# Patient Record
Sex: MALE | Race: BLACK OR AFRICAN AMERICAN | Employment: UNEMPLOYED | URBAN - METROPOLITAN AREA
[De-identification: names, ages, dates, MRNs, and addresses within clinical notes are randomized per-mention and may not be internally consistent; named-entity substitution may affect disease eponyms.]

---

## 2021-08-03 ENCOUNTER — APPOINTMENT (OUTPATIENT)
Dept: GENERAL RADIOLOGY | Age: 31
End: 2021-08-03

## 2021-08-03 ENCOUNTER — HOSPITAL ENCOUNTER (EMERGENCY)
Age: 31
Discharge: HOME | End: 2021-08-03
Attending: EMERGENCY MEDICINE

## 2021-08-03 VITALS
OXYGEN SATURATION: 100 % | DIASTOLIC BLOOD PRESSURE: 95 MMHG | RESPIRATION RATE: 14 BRPM | TEMPERATURE: 98.3 F | HEART RATE: 72 BPM | SYSTOLIC BLOOD PRESSURE: 159 MMHG | WEIGHT: 182 LBS | HEIGHT: 68 IN | BODY MASS INDEX: 27.58 KG/M2

## 2021-08-03 DIAGNOSIS — R07.9 CHEST PAIN, UNSPECIFIED TYPE: Primary | ICD-10-CM

## 2021-08-03 LAB
A/G RATIO: 1.9 (ref 1.1–2.2)
ALBUMIN SERPL-MCNC: 4.4 G/DL (ref 3.4–5)
ALP BLD-CCNC: 64 U/L (ref 40–129)
ALT SERPL-CCNC: 25 U/L (ref 10–40)
ANION GAP SERPL CALCULATED.3IONS-SCNC: 8 MMOL/L (ref 3–16)
AST SERPL-CCNC: 33 U/L (ref 15–37)
BASOPHILS ABSOLUTE: 0.1 K/UL (ref 0–0.2)
BASOPHILS RELATIVE PERCENT: 0.9 %
BILIRUB SERPL-MCNC: <0.2 MG/DL (ref 0–1)
BUN BLDV-MCNC: 15 MG/DL (ref 7–20)
CALCIUM SERPL-MCNC: 9 MG/DL (ref 8.3–10.6)
CHLORIDE BLD-SCNC: 103 MMOL/L (ref 99–110)
CO2: 28 MMOL/L (ref 21–32)
CREAT SERPL-MCNC: 0.9 MG/DL (ref 0.9–1.3)
EKG ATRIAL RATE: 72 BPM
EKG DIAGNOSIS: NORMAL
EKG P AXIS: 52 DEGREES
EKG P-R INTERVAL: 182 MS
EKG Q-T INTERVAL: 376 MS
EKG QRS DURATION: 86 MS
EKG QTC CALCULATION (BAZETT): 411 MS
EKG R AXIS: 79 DEGREES
EKG T AXIS: 55 DEGREES
EKG VENTRICULAR RATE: 72 BPM
EOSINOPHILS ABSOLUTE: 0.3 K/UL (ref 0–0.6)
EOSINOPHILS RELATIVE PERCENT: 4.2 %
GFR AFRICAN AMERICAN: >60
GFR NON-AFRICAN AMERICAN: >60
GLOBULIN: 2.3 G/DL
GLUCOSE BLD-MCNC: 91 MG/DL (ref 70–99)
HCT VFR BLD CALC: 41.2 % (ref 40.5–52.5)
HEMOGLOBIN: 14.1 G/DL (ref 13.5–17.5)
LYMPHOCYTES ABSOLUTE: 2.2 K/UL (ref 1–5.1)
LYMPHOCYTES RELATIVE PERCENT: 33.6 %
MCH RBC QN AUTO: 27.4 PG (ref 26–34)
MCHC RBC AUTO-ENTMCNC: 34.1 G/DL (ref 31–36)
MCV RBC AUTO: 80.3 FL (ref 80–100)
MONOCYTES ABSOLUTE: 0.4 K/UL (ref 0–1.3)
MONOCYTES RELATIVE PERCENT: 6.3 %
NEUTROPHILS ABSOLUTE: 3.6 K/UL (ref 1.7–7.7)
NEUTROPHILS RELATIVE PERCENT: 55 %
PDW BLD-RTO: 13 % (ref 12.4–15.4)
PLATELET # BLD: 184 K/UL (ref 135–450)
PMV BLD AUTO: 9 FL (ref 5–10.5)
POTASSIUM SERPL-SCNC: 3.9 MMOL/L (ref 3.5–5.1)
RBC # BLD: 5.13 M/UL (ref 4.2–5.9)
SODIUM BLD-SCNC: 139 MMOL/L (ref 136–145)
TOTAL PROTEIN: 6.7 G/DL (ref 6.4–8.2)
TROPONIN: <0.01 NG/ML
WBC # BLD: 6.6 K/UL (ref 4–11)

## 2021-08-03 PROCEDURE — 80053 COMPREHEN METABOLIC PANEL: CPT

## 2021-08-03 PROCEDURE — 99283 EMERGENCY DEPT VISIT LOW MDM: CPT

## 2021-08-03 PROCEDURE — 85025 COMPLETE CBC W/AUTO DIFF WBC: CPT

## 2021-08-03 PROCEDURE — 84484 ASSAY OF TROPONIN QUANT: CPT

## 2021-08-03 PROCEDURE — 93005 ELECTROCARDIOGRAM TRACING: CPT | Performed by: EMERGENCY MEDICINE

## 2021-08-03 PROCEDURE — 93010 ELECTROCARDIOGRAM REPORT: CPT | Performed by: INTERNAL MEDICINE

## 2021-08-03 PROCEDURE — 71046 X-RAY EXAM CHEST 2 VIEWS: CPT

## 2021-08-03 RX ORDER — BUPRENORPHINE AND NALOXONE 8; 2 MG/1; MG/1
1 FILM, SOLUBLE BUCCAL; SUBLINGUAL DAILY
COMMUNITY

## 2021-08-03 RX ORDER — FAMOTIDINE 20 MG/1
20 TABLET, FILM COATED ORAL 2 TIMES DAILY
Qty: 20 TABLET | Refills: 0 | Status: ON HOLD | OUTPATIENT
Start: 2021-08-03 | End: 2021-08-24 | Stop reason: HOSPADM

## 2021-08-03 ASSESSMENT — PAIN DESCRIPTION - LOCATION: LOCATION: BREAST;CHEST

## 2021-08-03 ASSESSMENT — PAIN SCALES - GENERAL: PAINLEVEL_OUTOF10: 2

## 2021-08-03 ASSESSMENT — PAIN DESCRIPTION - DESCRIPTORS: DESCRIPTORS: ACHING;BURNING

## 2021-08-03 ASSESSMENT — PAIN DESCRIPTION - PAIN TYPE: TYPE: ACUTE PAIN

## 2021-08-03 ASSESSMENT — PAIN DESCRIPTION - ORIENTATION: ORIENTATION: LEFT

## 2021-08-03 NOTE — ED NOTES
Patient given prescription, work note, discharge instructions verbal and written, patient verbalized understanding. Alert/oriented X4, Clear speech.   Patient exhibits no distress, ambulates with steady gait per self leaving unit, no further request.     Mabel Dandy, RN  08/03/21 3427

## 2021-08-03 NOTE — ED NOTES
Patient given prescription, work note, discharge instructions verbal and written, patient verbalized understanding. Alert/oriented X4, Clear speech.   Patient exhibits no distress, ambulates with steady gait per self leaving unit, no further request.     Nai Miranda RN  08/03/21 0089

## 2021-08-03 NOTE — ED TRIAGE NOTES
Patient to ed with complaints of left chest/breats pain which started 4 days ago, patient reports pain increasing with lifting or increased activity he describes pain as a burning he denies radiating pain, no nausea or vomiting.  ekg bedside.

## 2021-08-03 NOTE — ED PROVIDER NOTES
CHIEF COMPLAINT  Chest Pain (left breast area)      HISTORY OF PRESENT ILLNESS  March Serum J Case is a 32 y.o. male with a history of hypertension who presents to the ED complaining of chest pain/chest wall pain. Patient denies any obvious trauma or injury. Reports that he is constantly required to move heavy objects at work. Patient has noted some moderate \"burning \"in the left side of his chest over the last several days. Symptoms are worse with heavy lifting and patient is also noted symptoms with exertion. No fevers, chills, or sweats. No cough, or congestion. No lower extremity swelling or edema. .   No other complaints, modifying factors or associated symptoms. I have reviewed the following from the nursing documentation. Past Medical History:   Diagnosis Date    Hypertension      History reviewed. No pertinent surgical history. History reviewed. No pertinent family history. Social History     Socioeconomic History    Marital status: Single     Spouse name: Not on file    Number of children: Not on file    Years of education: Not on file    Highest education level: Not on file   Occupational History    Not on file   Tobacco Use    Smoking status: Current Every Day Smoker     Packs/day: 1.00     Types: Cigarettes    Smokeless tobacco: Never Used   Substance and Sexual Activity    Alcohol use: Yes     Comment: 2-3 x week    Drug use: Yes     Types: Opiates      Comment: snorting    Sexual activity: Not on file   Other Topics Concern    Not on file   Social History Narrative    Not on file     Social Determinants of Health     Financial Resource Strain:     Difficulty of Paying Living Expenses:    Food Insecurity:     Worried About Running Out of Food in the Last Year:     Ran Out of Food in the Last Year:    Transportation Needs:     Lack of Transportation (Medical):      Lack of Transportation (Non-Medical):    Physical Activity:     Days of Exercise per Week:     Minutes of Exercise per Session:    Stress:     Feeling of Stress :    Social Connections:     Frequency of Communication with Friends and Family:     Frequency of Social Gatherings with Friends and Family:     Attends [employer] Services:     Active Member of Clubs or Organizations:     Attends Club or Organization Meetings:     Marital Status:    Intimate Partner Violence:     Fear of Current or Ex-Partner:     Emotionally Abused:     Physically Abused:     Sexually Abused:      No current facility-administered medications for this encounter. Current Outpatient Medications   Medication Sig Dispense Refill    buprenorphine-naloxone (SUBOXONE) 8-2 MG FILM SL film Place 1 Film under the tongue daily.  famotidine (PEPCID) 20 MG tablet Take 1 tablet by mouth 2 times daily 20 tablet 0     No Known Allergies    REVIEW OF SYSTEMS  10 systems reviewed, pertinent positives per HPI otherwise noted to be negative. PHYSICAL EXAM  BP (!) 159/95   Pulse 72   Temp 98.3 °F (36.8 °C) (Oral)   Resp 14   Ht 5' 8\" (1.727 m)   Wt 182 lb (82.6 kg)   SpO2 100%   BMI 27.67 kg/m²   GENERAL APPEARANCE: Awake and alert. Cooperative. No acute distress. HEAD: Normocephalic. Atraumatic. EYES: PERRL. EOM's grossly intact. .  ENT: Mucous membranes are moist.   NECK: Supple, trachea midline. HEART: RRR. Normal S1, S2. No murmurs, rubs or gallops. LUNGS: Respirations unlabored. CTAB. Good air exchange. No wheezes, rales, or rhonchi. Speaking comfortably in full sentences. ABDOMEN: Soft. Non-distended. Non-tender. No guarding or rebound. Normal Bowel sounds. EXTREMITIES: No peripheral edema. MAEE. No acute deformities. SKIN: Warm and dry. No acute rashes. NEUROLOGICAL: Alert and oriented X 3. CN II-XII intact. No gross facial drooping. Strength 5/5, sensation intact. No pronator drift. Normal coordination. Gait normal.   PSYCHIATRIC: Normal mood and affect. LABS  I have reviewed all labs for this visit.    Results for orders placed or performed during the hospital encounter of 08/03/21   CBC Auto Differential   Result Value Ref Range    WBC 6.6 4.0 - 11.0 K/uL    RBC 5.13 4.20 - 5.90 M/uL    Hemoglobin 14.1 13.5 - 17.5 g/dL    Hematocrit 41.2 40.5 - 52.5 %    MCV 80.3 80.0 - 100.0 fL    MCH 27.4 26.0 - 34.0 pg    MCHC 34.1 31.0 - 36.0 g/dL    RDW 13.0 12.4 - 15.4 %    Platelets 858 791 - 530 K/uL    MPV 9.0 5.0 - 10.5 fL    Neutrophils % 55.0 %    Lymphocytes % 33.6 %    Monocytes % 6.3 %    Eosinophils % 4.2 %    Basophils % 0.9 %    Neutrophils Absolute 3.6 1.7 - 7.7 K/uL    Lymphocytes Absolute 2.2 1.0 - 5.1 K/uL    Monocytes Absolute 0.4 0.0 - 1.3 K/uL    Eosinophils Absolute 0.3 0.0 - 0.6 K/uL    Basophils Absolute 0.1 0.0 - 0.2 K/uL   Comprehensive Metabolic Panel   Result Value Ref Range    Sodium 139 136 - 145 mmol/L    Potassium 3.9 3.5 - 5.1 mmol/L    Chloride 103 99 - 110 mmol/L    CO2 28 21 - 32 mmol/L    Anion Gap 8 3 - 16    Glucose 91 70 - 99 mg/dL    BUN 15 7 - 20 mg/dL    CREATININE 0.9 0.9 - 1.3 mg/dL    GFR Non-African American >60 >60    GFR African American >60 >60    Calcium 9.0 8.3 - 10.6 mg/dL    Total Protein 6.7 6.4 - 8.2 g/dL    Albumin 4.4 3.4 - 5.0 g/dL    Albumin/Globulin Ratio 1.9 1.1 - 2.2    Total Bilirubin <0.2 0.0 - 1.0 mg/dL    Alkaline Phosphatase 64 40 - 129 U/L    ALT 25 10 - 40 U/L    AST 33 15 - 37 U/L    Globulin 2.3 g/dL   Troponin   Result Value Ref Range    Troponin <0.01 <0.01 ng/mL       EKG  The Ekg interpreted by myself  normal sinus rhythm with a rate of 72  Axis is   Normal  QTc is  normal  Intervals and Durations are unremarkable. No specific ST-T wave changes appreciated. No evidence of acute ischemia. No previous EKGs reviewed    Cardiac Monitoring: No ectopy. Normal rate. RADIOLOGY  X-RAYS:  I have reviewed radiologic plain film image(s). ALL OTHER NON-PLAIN FILM IMAGES SUCH AS CT, ULTRASOUND AND MRI HAVE BEEN READ BY THE RADIOLOGIST.   XR CHEST (2 VW) Final Result      No acute pulmonary pathology                             Rechecks: Physical assessment performed. Patient resting comfortably. HEART SCORE:    History: +0 for low suspicion  EKG: +0 for normal EKG   Age: [de-identified] for age <45 years  Risk factors (includes HLD, HTN, DM, tobacco use, obesity, and +FHx): +1 for 1-2 risk factors  Initial troponin: +0 for negative troponin    Heart score: 1. This falls under the following category: Score of 0-3, which indicates a very low risk for major adverse cardiac event and supports early discharge        ED COURSE/MDM  Patient seen and evaluated. Old records reviewed. Labs and imaging reviewed and results discussed with patient. Patient was stable throughout his stay. Patient's labs, EKG, and chest x-ray appears stable. I estimate the patient is at low risk of acute myocardial infarction, pneumothorax, or pulmonary embolus. Close outpatient management was recommended including discussions for potential stress test with PCP and/or cardiology. . Patient was reassessed as noted above . No acute pathology was noted and pt is safe for discharge home to follow up with PCP. Hector Thompson Plan of care discussed with patient and family. Patient and family in agreement with plan. Patient was given scripts for the following medications. I counseled patient how to take these medications. New Prescriptions    FAMOTIDINE (PEPCID) 20 MG TABLET    Take 1 tablet by mouth 2 times daily       CLINICAL IMPRESSION  1. Chest pain, unspecified type        Blood pressure (!) 159/95, pulse 72, temperature 98.3 °F (36.8 °C), temperature source Oral, resp. rate 14, height 5' 8\" (1.727 m), weight 182 lb (82.6 kg), SpO2 100 %. DISPOSITION  Lucas OWEN Case was discharged to home in stable condition.         David Robles DO  08/03/21 1700

## 2021-08-05 NOTE — PROGRESS NOTES
[address]      Primary Care Doctor:  No primary care provider on file. Primary Cardiologist: none    Chief Complaint:  Chest pain    History of Present Illness:  Rosemary Morrow Case is a 32 y.o. male with PMH HTN who presents today for ED  F/u. He was seen in ED 8/3 for CP, r/o for AMI and given pepcid. Today: He c/o continued chest pain that he describes as burning in the center of his chest with radiation to his left arm that is brought on by activity or stress for the past 10days and relieved by rest.  Associated sx include  palpitations, fatigue  He denies dyspnea, orthopnea, PND, early saiety, edema, syncope. Cardiac Risk Factors  Family Hx:  brother  from CHF and ESRD in his 35s  Tobacco:  Yes, 1PPD  HTN:  Yes  Lipids never checked   DM:  No  The CVD Risk score (KYE'Agostino, et al., 2008) failed to calculate for the following reasons:    Cannot find a previous HDL lab    Cannot find a previous total cholesterol lab       Pt Education: The patient has received education on the following topics: dietary guidelines, heart medications, the importance of physical activity, symptom management and reduction of cardiac risk factors. Past Medical History:   has a past medical history of Hypertension. SurgicalHistory:   has no past surgical history on file. Social History:   reports that he has been smoking cigarettes. He has been smoking about 1.00 pack per day. He has never used smokeless tobacco. He reports current alcohol use. He reports current drug use. Drug: Opiates . Family History:   No family history on file. Home Medications:  Prior to Admission medications    Medication Sig Start Date End Date Taking? Authorizing Provider   buprenorphine-naloxone (SUBOXONE) 8-2 MG FILM SL film Place 1 Film under the tongue daily.     Historical Provider, MD   famotidine (PEPCID) 20 MG tablet Take 1 tablet by mouth 2 times daily 8/3/21   Leatha Shelley DO        Allergies:  Patient has no known allergies. ROS:   Review of Systems   Constitutional: Positive for fatigue. Respiratory: Negative. Cardiovascular: Positive for chest pain and palpitations. Gastrointestinal: Negative. Genitourinary: Negative. Musculoskeletal: Negative. Skin: Negative. Neurological: Negative. Hematological: Negative. Psychiatric/Behavioral: Negative. Physical Examination:    Vitals:    08/06/21 1322   BP: 108/66   Pulse: 84   Weight: 173 lb (78.5 kg)           Physical Exam  Vitals reviewed. Constitutional:       Appearance: Normal appearance. He is normal weight. HENT:      Head: Normocephalic and atraumatic. Eyes:      Extraocular Movements: Extraocular movements intact. Pupils: Pupils are equal, round, and reactive to light. Cardiovascular:      Rate and Rhythm: Normal rate and regular rhythm. Pulses: Normal pulses. Heart sounds: Normal heart sounds. Pulmonary:      Effort: Pulmonary effort is normal.      Breath sounds: Normal breath sounds. Abdominal:      Palpations: Abdomen is soft. Musculoskeletal:         General: Normal range of motion. Cervical back: Normal range of motion and neck supple. Skin:     General: Skin is warm and dry. Neurological:      General: No focal deficit present. Mental Status: He is alert and oriented to person, place, and time. Mental status is at baseline. Psychiatric:         Mood and Affect: Mood normal.         Behavior: Behavior normal.         Thought Content:  Thought content normal.         Judgment: Judgment normal.         Lab Data:    CBC:   Lab Results   Component Value Date    WBC 6.6 08/03/2021    RBC 5.13 08/03/2021    HGB 14.1 08/03/2021    HCT 41.2 08/03/2021    MCV 80.3 08/03/2021    RDW 13.0 08/03/2021     08/03/2021     BMP:  Lab Results   Component Value Date     08/03/2021    K 3.9 08/03/2021     08/03/2021    CO2 28 08/03/2021    BUN 15 08/03/2021    CREATININE 0.9 08/03/2021 BNP: No results found for: PROBNP       Assessment/Plan:    1. Chest pain, unspecified type - echo, st, lipid profile   2. Essential hypertension - controlled       Time   30-39minutes spent preparing to see patient including reviewing patient history/prior tests/prior consults, performing a medical exam, counseling and educating patient/family/caregiver, ordering medications/tests/procedures, referring and communicating with PCPs and other pertinent consultants, documenting information in the EMR, independently interpreting results and communicating to family and coordination of patient care. Instructions:   1. Medications: no change  2. Labs: fasting today  3. Referrals: Echo and stress test  4. Lifestyle Recommendations: Stop smoking, cut out processed foods and eat a plant based diet with lean sources of protein, exercise at least 3 times a week for at least 20 minutes and incorporate weight resistance exercises.   5. Follow up: after testing          I appreciate the opportunity of cooperating in the care of this individual.    ANTHONY Grove - CNP, CNP, 8/5/2021, 4:09 PM

## 2021-08-06 ENCOUNTER — OFFICE VISIT (OUTPATIENT)
Dept: CARDIOLOGY CLINIC | Age: 31
End: 2021-08-06

## 2021-08-06 VITALS
DIASTOLIC BLOOD PRESSURE: 66 MMHG | SYSTOLIC BLOOD PRESSURE: 108 MMHG | BODY MASS INDEX: 26.3 KG/M2 | HEART RATE: 84 BPM | WEIGHT: 173 LBS

## 2021-08-06 DIAGNOSIS — R07.9 CHEST PAIN, UNSPECIFIED TYPE: Primary | ICD-10-CM

## 2021-08-06 DIAGNOSIS — I10 ESSENTIAL HYPERTENSION: ICD-10-CM

## 2021-08-06 PROCEDURE — 99204 OFFICE O/P NEW MOD 45 MIN: CPT | Performed by: NURSE PRACTITIONER

## 2021-08-06 ASSESSMENT — ENCOUNTER SYMPTOMS
RESPIRATORY NEGATIVE: 1
GASTROINTESTINAL NEGATIVE: 1

## 2021-08-06 NOTE — PATIENT INSTRUCTIONS
Instructions:   1. Medications: no change  2. Labs: fasting today  3. Referrals: Echo and stress test  4. Lifestyle Recommendations: Stop smoking, cut out processed foods and eat a plant based diet with lean sources of protein, exercise at least 3 times a week for at least 20 minutes and incorporate weight resistance exercises.   5. Follow up: after testing

## 2021-08-09 ENCOUNTER — APPOINTMENT (OUTPATIENT)
Dept: GENERAL RADIOLOGY | Age: 31
End: 2021-08-09

## 2021-08-09 ENCOUNTER — HOSPITAL ENCOUNTER (EMERGENCY)
Age: 31
Discharge: HOME | End: 2021-08-09
Attending: EMERGENCY MEDICINE

## 2021-08-09 VITALS
OXYGEN SATURATION: 100 % | HEIGHT: 68 IN | BODY MASS INDEX: 26.52 KG/M2 | RESPIRATION RATE: 11 BRPM | TEMPERATURE: 98 F | HEART RATE: 59 BPM | SYSTOLIC BLOOD PRESSURE: 126 MMHG | DIASTOLIC BLOOD PRESSURE: 70 MMHG | WEIGHT: 175 LBS

## 2021-08-09 DIAGNOSIS — R07.9 CHEST PAIN, UNSPECIFIED TYPE: ICD-10-CM

## 2021-08-09 DIAGNOSIS — R42 LIGHTHEADEDNESS: Primary | ICD-10-CM

## 2021-08-09 LAB
ANION GAP SERPL CALCULATED.3IONS-SCNC: 9 MMOL/L (ref 3–16)
BASOPHILS ABSOLUTE: 0.1 K/UL (ref 0–0.2)
BASOPHILS RELATIVE PERCENT: 0.5 %
BUN BLDV-MCNC: 17 MG/DL (ref 7–20)
CALCIUM SERPL-MCNC: 10.6 MG/DL (ref 8.3–10.6)
CHLORIDE BLD-SCNC: 95 MMOL/L (ref 99–110)
CO2: 31 MMOL/L (ref 21–32)
CREAT SERPL-MCNC: 1 MG/DL (ref 0.9–1.3)
EKG ATRIAL RATE: 72 BPM
EKG DIAGNOSIS: NORMAL
EKG P AXIS: 72 DEGREES
EKG P-R INTERVAL: 184 MS
EKG Q-T INTERVAL: 380 MS
EKG QRS DURATION: 82 MS
EKG QTC CALCULATION (BAZETT): 416 MS
EKG R AXIS: 84 DEGREES
EKG T AXIS: 66 DEGREES
EKG VENTRICULAR RATE: 72 BPM
EOSINOPHILS ABSOLUTE: 0.1 K/UL (ref 0–0.6)
EOSINOPHILS RELATIVE PERCENT: 0.7 %
GFR AFRICAN AMERICAN: >60
GFR NON-AFRICAN AMERICAN: >60
GLUCOSE BLD-MCNC: 107 MG/DL (ref 70–99)
HCT VFR BLD CALC: 51 % (ref 40.5–52.5)
HEMOGLOBIN: 17.6 G/DL (ref 13.5–17.5)
LYMPHOCYTES ABSOLUTE: 1.7 K/UL (ref 1–5.1)
LYMPHOCYTES RELATIVE PERCENT: 16.6 %
MCH RBC QN AUTO: 28.3 PG (ref 26–34)
MCHC RBC AUTO-ENTMCNC: 34.6 G/DL (ref 31–36)
MCV RBC AUTO: 81.9 FL (ref 80–100)
MONOCYTES ABSOLUTE: 0.4 K/UL (ref 0–1.3)
MONOCYTES RELATIVE PERCENT: 3.9 %
NEUTROPHILS ABSOLUTE: 7.9 K/UL (ref 1.7–7.7)
NEUTROPHILS RELATIVE PERCENT: 78.3 %
PDW BLD-RTO: 13.5 % (ref 12.4–15.4)
PLATELET # BLD: 240 K/UL (ref 135–450)
PMV BLD AUTO: 8.7 FL (ref 5–10.5)
POTASSIUM REFLEX MAGNESIUM: 4.4 MMOL/L (ref 3.5–5.1)
PRO-BNP: <5 PG/ML (ref 0–124)
RBC # BLD: 6.23 M/UL (ref 4.2–5.9)
SODIUM BLD-SCNC: 135 MMOL/L (ref 136–145)
TROPONIN: <0.01 NG/ML
WBC # BLD: 10.1 K/UL (ref 4–11)

## 2021-08-09 PROCEDURE — 85025 COMPLETE CBC W/AUTO DIFF WBC: CPT

## 2021-08-09 PROCEDURE — U0005 INFEC AGEN DETEC AMPLI PROBE: HCPCS

## 2021-08-09 PROCEDURE — 2580000003 HC RX 258: Performed by: STUDENT IN AN ORGANIZED HEALTH CARE EDUCATION/TRAINING PROGRAM

## 2021-08-09 PROCEDURE — 93005 ELECTROCARDIOGRAM TRACING: CPT | Performed by: STUDENT IN AN ORGANIZED HEALTH CARE EDUCATION/TRAINING PROGRAM

## 2021-08-09 PROCEDURE — 71046 X-RAY EXAM CHEST 2 VIEWS: CPT

## 2021-08-09 PROCEDURE — 84484 ASSAY OF TROPONIN QUANT: CPT

## 2021-08-09 PROCEDURE — 99283 EMERGENCY DEPT VISIT LOW MDM: CPT

## 2021-08-09 PROCEDURE — 83880 ASSAY OF NATRIURETIC PEPTIDE: CPT

## 2021-08-09 PROCEDURE — U0003 INFECTIOUS AGENT DETECTION BY NUCLEIC ACID (DNA OR RNA); SEVERE ACUTE RESPIRATORY SYNDROME CORONAVIRUS 2 (SARS-COV-2) (CORONAVIRUS DISEASE [COVID-19]), AMPLIFIED PROBE TECHNIQUE, MAKING USE OF HIGH THROUGHPUT TECHNOLOGIES AS DESCRIBED BY CMS-2020-01-R: HCPCS

## 2021-08-09 PROCEDURE — 80048 BASIC METABOLIC PNL TOTAL CA: CPT

## 2021-08-09 RX ORDER — SODIUM CHLORIDE, SODIUM LACTATE, POTASSIUM CHLORIDE, CALCIUM CHLORIDE 600; 310; 30; 20 MG/100ML; MG/100ML; MG/100ML; MG/100ML
1000 INJECTION, SOLUTION INTRAVENOUS ONCE
Status: COMPLETED | OUTPATIENT
Start: 2021-08-09 | End: 2021-08-09

## 2021-08-09 RX ADMIN — SODIUM CHLORIDE, POTASSIUM CHLORIDE, SODIUM LACTATE AND CALCIUM CHLORIDE 1000 ML: 600; 310; 30; 20 INJECTION, SOLUTION INTRAVENOUS at 13:42

## 2021-08-09 NOTE — ED PROVIDER NOTES
ED Attending Attestation Note     Date of evaluation: 8/9/2021    This patient was seen by the resident. I have seen and examined the patient, agree with the workup, evaluation, management and diagnosis. The care plan has been discussed. I have reviewed the ECG and concur with the resident's interpretation. My assessment reveals a well-appearing young gentleman, pleasantly conversational, in no acute distress. He presents to the emergency department with continued episodes of burning chest pain, which he states often occur when he is lifting heavy things at work, also associated today with dizziness and lightheadedness while at work. He has a normal cardiovascular examination and clear lungs on exam.  His laboratory studies show some mild evidence of dehydration, and he is feeling symptomatically improved with fluid hydration in the emergency department. He was seen at [facility] emergency department about 1 week ago with similar chest pain, and followed up 3 days ago with cardiology, who ordered an outpatient stress test and echocardiogram, which are scheduled for next week.        Alexsandra Madera MD  08/09/21 4479

## 2021-08-09 NOTE — ED PROVIDER NOTES
Waves:no acute change  Q Waves: none  Clinical Impression: no acute changes; benign early repolarization   Comparison:  Unchanged     RADIOLOGY:  XR CHEST (2 VW)   Final Result      No acute cardiopulmonary findings. LABS:   Results for orders placed or performed during the hospital encounter of 08/09/21   CBC Auto Differential   Result Value Ref Range    WBC 10.1 4.0 - 11.0 K/uL    RBC 6.23 (H) 4.20 - 5.90 M/uL    Hemoglobin 17.6 (H) 13.5 - 17.5 g/dL    Hematocrit 51.0 40.5 - 52.5 %    MCV 81.9 80.0 - 100.0 fL    MCH 28.3 26.0 - 34.0 pg    MCHC 34.6 31.0 - 36.0 g/dL    RDW 13.5 12.4 - 15.4 %    Platelets 441 946 - 444 K/uL    MPV 8.7 5.0 - 10.5 fL    Neutrophils % 78.3 %    Lymphocytes % 16.6 %    Monocytes % 3.9 %    Eosinophils % 0.7 %    Basophils % 0.5 %    Neutrophils Absolute 7.9 (H) 1.7 - 7.7 K/uL    Lymphocytes Absolute 1.7 1.0 - 5.1 K/uL    Monocytes Absolute 0.4 0.0 - 1.3 K/uL    Eosinophils Absolute 0.1 0.0 - 0.6 K/uL    Basophils Absolute 0.1 0.0 - 0.2 K/uL   Basic Metabolic Panel w/ Reflex to MG   Result Value Ref Range    Sodium 135 (L) 136 - 145 mmol/L    Potassium reflex Magnesium 4.4 3.5 - 5.1 mmol/L    Chloride 95 (L) 99 - 110 mmol/L    CO2 31 21 - 32 mmol/L    Anion Gap 9 3 - 16    Glucose 107 (H) 70 - 99 mg/dL    BUN 17 7 - 20 mg/dL    CREATININE 1.0 0.9 - 1.3 mg/dL    GFR Non-African American >60 >60    GFR African American >60 >60    Calcium 10.6 8.3 - 10.6 mg/dL   Brain Natriuretic Peptide   Result Value Ref Range    Pro-BNP <5 0 - 124 pg/mL   Troponin   Result Value Ref Range    Troponin <0.01 <0.01 ng/mL   EKG 12 Lead   Result Value Ref Range    Ventricular Rate 72 BPM    Atrial Rate 72 BPM    P-R Interval 184 ms    QRS Duration 82 ms    Q-T Interval 380 ms    QTc Calculation (Bazett) 416 ms    P Axis 72 degrees    R Axis 84 degrees    T Axis 66 degrees    Diagnosis       EKG performed in ER and to be interpreted by ER physician. Confirmed by MD, ER ([id number]),  Sandy Salomon ([id number]) on 8/9/2021 10:35:25 AM       RECENT VITALS:  BP: 126/79, Temp: 98 °F (36.7 °C), Pulse: 57,Resp: 11, SpO2: 97 %     Procedures         ED Course     Nursing Notes, Past Medical Hx, Past Surgical Hx, Social Hx, Allergies, and Family Hx were reviewed. The patient was given the followingmedications:  Orders Placed This Encounter   Medications    lactated ringers infusion 1,000 mL       CONSULTS:  None    MEDICAL DECISION MAKING / ASSESSMENT / Michelle OWEN Case is a 32 y.o. male presenting with history and exam as above. Presentation is consistent with viral illness or dehydration related to working in the heat. EKG shows normal sinus rhythm with no signs of acute ischemia or arrhythmia genic disorder, and troponin is negative, lowering suspicion for ACS as an etiology of symptoms. Heart score is 1. BNP is normal and chest x-ray shows no signs of pulmonary edema or cardiomegaly, lowering suspicion for CHF. Chest x-ray also shows no other acute abnormality such as pneumonia or pneumothorax to explain the patient's shortness of breath. As the patient is not currently having any chest pain, GERD is unlikely related to his symptoms today. Labs demonstrate no anemia or significant electrolyte abnormalities but are consistent with mild dehydration. The patient was therefore treated with a 1 L IVF bolus. He is remained afebrile and hemodynamically stable with a benign physical exam throughout his visit and is therefore stable for discharge home. A Covid test was performed, and the patient was advised to self quarantine until that test results. It appears that his outpatient cardiologist is already ordered a stress test and echo, so he was also counseled to call to schedule these tests at his convenience. He was given strict return precautions. This patient was also evaluated by the attending physician. All care plans werediscussed and agreed upon. Clinical Impression     1.  Lightheadedness Disposition     PATIENT REFERRED TO:  Your PCP and cardiologist    Call today  To schedule your stress test and echocardiogram      DISCHARGE MEDICATIONS:  New Prescriptions    No medications on file       Nayely Tello MD  Resident  08/09/21 1073

## 2021-08-09 NOTE — ED NOTES
Patient prepared for and ready to be discharged. Patient discharged at this time to home in care of self in no acute distress after verbalizing understanding of discharge instructions. Patient left after receiving After Visit Summary instructions. IV removed prior to discharge.        Tod Bell RN  08/09/21 1533

## 2021-08-10 LAB — SARS-COV-2, PCR: NOT DETECTED

## 2021-08-18 ENCOUNTER — HOSPITAL ENCOUNTER (OUTPATIENT)
Dept: NON INVASIVE DIAGNOSTICS | Age: 31
Discharge: HOME | End: 2021-08-18

## 2021-08-18 DIAGNOSIS — R07.9 CHEST PAIN, UNSPECIFIED TYPE: ICD-10-CM

## 2021-08-18 LAB
LV EF: 63 %
LV EF: 64 %
LVEF MODALITY: NORMAL
LVEF MODALITY: NORMAL

## 2021-08-18 PROCEDURE — 78452 HT MUSCLE IMAGE SPECT MULT: CPT

## 2021-08-18 PROCEDURE — 2580000003 HC RX 258: Performed by: NURSE PRACTITIONER

## 2021-08-18 PROCEDURE — A9502 TC99M TETROFOSMIN: HCPCS | Performed by: NURSE PRACTITIONER

## 2021-08-18 PROCEDURE — 93306 TTE W/DOPPLER COMPLETE: CPT

## 2021-08-18 PROCEDURE — 93017 CV STRESS TEST TRACING ONLY: CPT

## 2021-08-18 PROCEDURE — 3430000000 HC RX DIAGNOSTIC RADIOPHARMACEUTICAL: Performed by: NURSE PRACTITIONER

## 2021-08-18 RX ORDER — SODIUM CHLORIDE 0.9 % (FLUSH) 0.9 %
10 SYRINGE (ML) INJECTION 2 TIMES DAILY
Status: DISCONTINUED | OUTPATIENT
Start: 2021-08-18 | End: 2021-08-19 | Stop reason: HOSPADM

## 2021-08-18 RX ADMIN — TETROFOSMIN 30 MILLICURIE: 1.38 INJECTION, POWDER, LYOPHILIZED, FOR SOLUTION INTRAVENOUS at 09:58

## 2021-08-18 RX ADMIN — Medication 10 ML: at 08:35

## 2021-08-18 RX ADMIN — TETROFOSMIN 11 MILLICURIE: 1.38 INJECTION, POWDER, LYOPHILIZED, FOR SOLUTION INTRAVENOUS at 08:35

## 2021-08-18 RX ADMIN — Medication 10 ML: at 09:58

## 2021-08-19 ENCOUNTER — TELEPHONE (OUTPATIENT)
Dept: CARDIOLOGY CLINIC | Age: 31
End: 2021-08-19

## 2021-08-19 NOTE — TELEPHONE ENCOUNTER
Patient's mom called. She wants to move appointment up sooner if possible so angiogram can be scheduled. Can he be seen in [place] tomorrow?

## 2021-08-20 ENCOUNTER — PREP FOR PROCEDURE (OUTPATIENT)
Dept: CARDIOLOGY CLINIC | Age: 31
End: 2021-08-20

## 2021-08-20 ENCOUNTER — OFFICE VISIT (OUTPATIENT)
Dept: CARDIOLOGY CLINIC | Age: 31
End: 2021-08-20

## 2021-08-20 VITALS
OXYGEN SATURATION: 96 % | WEIGHT: 169.6 LBS | HEIGHT: 68 IN | SYSTOLIC BLOOD PRESSURE: 110 MMHG | BODY MASS INDEX: 25.7 KG/M2 | DIASTOLIC BLOOD PRESSURE: 64 MMHG | HEART RATE: 84 BPM

## 2021-08-20 DIAGNOSIS — R94.39 ABNORMAL STRESS TEST: Primary | ICD-10-CM

## 2021-08-20 DIAGNOSIS — R94.39 ABNORMAL STRESS TEST: ICD-10-CM

## 2021-08-20 DIAGNOSIS — R07.9 CHEST PAIN, UNSPECIFIED TYPE: Primary | ICD-10-CM

## 2021-08-20 PROCEDURE — 99203 OFFICE O/P NEW LOW 30 MIN: CPT | Performed by: INTERNAL MEDICINE

## 2021-08-20 RX ORDER — SODIUM CHLORIDE 0.9 % (FLUSH) 0.9 %
5-40 SYRINGE (ML) INJECTION EVERY 12 HOURS SCHEDULED
Status: CANCELLED | OUTPATIENT
Start: 2021-08-20

## 2021-08-20 RX ORDER — ASPIRIN 325 MG
325 TABLET ORAL ONCE
Status: CANCELLED | OUTPATIENT
Start: 2021-08-23

## 2021-08-20 RX ORDER — SODIUM CHLORIDE 0.9 % (FLUSH) 0.9 %
5-40 SYRINGE (ML) INJECTION PRN
Status: CANCELLED | OUTPATIENT
Start: 2021-08-20

## 2021-08-20 RX ORDER — ATORVASTATIN CALCIUM 40 MG/1
40 TABLET, FILM COATED ORAL DAILY
Qty: 30 TABLET | Refills: 0 | Status: ON HOLD | OUTPATIENT
Start: 2021-08-20 | End: 2021-08-24 | Stop reason: SDUPTHER

## 2021-08-20 RX ORDER — SODIUM CHLORIDE 9 MG/ML
INJECTION, SOLUTION INTRAVENOUS CONTINUOUS
Status: CANCELLED | OUTPATIENT
Start: 2021-08-20

## 2021-08-20 RX ORDER — CLOPIDOGREL BISULFATE 75 MG/1
75 TABLET ORAL DAILY
Qty: 30 TABLET | Refills: 0 | Status: ON HOLD | OUTPATIENT
Start: 2021-08-20 | End: 2021-08-24 | Stop reason: SDUPTHER

## 2021-08-20 RX ORDER — SODIUM CHLORIDE 9 MG/ML
25 INJECTION, SOLUTION INTRAVENOUS PRN
Status: CANCELLED | OUTPATIENT
Start: 2021-08-20

## 2021-08-20 RX ORDER — ASPIRIN 81 MG/1
81 TABLET ORAL DAILY
Qty: 90 TABLET | Refills: 1 | Status: ON HOLD | OUTPATIENT
Start: 2021-08-20 | End: 2021-08-24 | Stop reason: SDUPTHER

## 2021-08-20 NOTE — PROGRESS NOTES
Left Heart Catheterization    A left heart catheterization is a procedure that provides your cardiologist with detailed information regarding how your heart functions. A small catheter (long, fine tube) is inserted into an artery (a vessel that carries blood and oxygen) that leads to your heart. While watching with x-ray equipment, small amounts of dye are injected which enables visualization of the heart arteries and chambers. The pictures that your cardiologist receives from the cardiac catheterization enable him or her to decide on the best treatment for you. Date of the procedure:  8/23/21    Time of arrival: 200    Cardiologist performing the procedure: _[facility]    Instructions for your left heart catheterization:    1. Bring a list of your medications to the hospital.    2.  Please notify us before the procedure if you are allergic to anything; especially x-ray contrast dye, iodine, nickel, or any type of jewelry. This is very important! 3. Do not eat or drink anything at all after midnight (or 8 hours) prior to the procedure. 4.  Take all morning medications EXCEPT any diuretics (water pills) the day of the procedure with a small sip of water. 5.  If you are on Coumadin, Warfarin, or Tod Erick, please notify us so that we can make adjustments to your medication. 6.  If you are taking Xarelto, Eliquis, or Pradaxa, please stop staking these medications two days prior to the procedure (including the day of the procedure). 7.  If you are diabetic, check your blood sugar in the morning. If your blood sugar is 120 or less, do not take insulin. If your blood sugar is more than 120, take half the dose of your normal insulin. Do not take Metformin the night before your procedure or morning of the procedure. 8.  You MUST have someone to drive you home--no driving for 24 hours after your procedure.   If an intervention is performed, you might stay overnight in the hospital.    9. Discharge instructions will be given to you at the time of your procedure. 10.  For any questions or if you cannot keep this appointment for any reason, please call [phone]. Spoke with pt regarding procedure. Pre-op instructions, time and location of arrival discussed. Pt will get COVID test 5-6 days prior to procedure. Pt verbalized understanding and all questions answered at this time.

## 2021-08-20 NOTE — PROGRESS NOTES
32 y.o. here for left-sided chest burning. Usually active. Exercised prior to recent pain. Spreads to arm and back. Happens with exertion but he hasn't been doing much in the last few days so hasn't had it. Been going on for 2 weeks. Started suddenly. Started while cutting the grass. Had a stress test 8/18/2021 that was abnormal and he is here now for a consult. Interestingly, no chest pain with the stress test despite the moderate sized reversible defect. Has been to the ER twice for this. Past Medical History:   Diagnosis Date    Hypertension      No past surgical history on file. Social History     Tobacco Use    Smoking status: Current Every Day Smoker     Packs/day: 1.00     Types: Cigarettes    Smokeless tobacco: Never Used   Substance Use Topics    Alcohol use: Yes     Comment: 2-3 x week    Drug use: Not Currently     Types: Opiates      Comment: snorting   1 ppd x 9-10 years. No Known Allergies    FH:  No Fh of premature CAD. Review of Systems   General: No fevers, chills, fatigue, or night sweats. No abnormal changes in weight. HEENT: No blurry or decreased vision. No changes in hearing, nasal discharge or sore throat. Cardiovascular: See HPI. No cramping in legs or buttocks when walking. Respiratory: No cough, hemoptysis, or wheezing. No history of asthma. Gastrointestinal: No abdominal pain, hematochezia, melana, or history of GI ulcers. Genito-Urinary: No dysuria or hematuria. No urgency or polyuria. Musculoskeletal: No complaints of joint pain, joint swelling or muscular weakness/soreness. Neurological: No dizziness or headaches. No numbness/tingling, speech problems or weakness. No history of a stroke or TIA. Psychological: No anxiety or depression  Hematological and Lymphatic: No abnormal bleeding or bruising, blood clots, jaundice. Endocrine: No malaise/lethargy, palpitations, polydipsia/polyuria, temperature intolerance or unexpected weight changes. Skin: No rashes or non-healing ulcers. PE:  Blood pressure 110/64, pulse 84, height 5' 8\" (1.727 m), weight 169 lb 9.6 oz (76.9 kg), SpO2 96 %. General (appearance): Well devel. No distress  Eyes: Anicteric. EOMI. Neck: Supple. No JVD  Ears/Nose/Mouth/Thorat: No cyanosis  CV: RRR. No m/r/g   Respiratory:  Clear B, normal respiratory effort  GI: abd s/nt/nd  Skin: Warm, dry. No rashes  Neuro/Psych: Alert and oriented x 3. Appropriate behavior  Ext:  No c/c. No edema  Pulses:  2+ radial    Lab Results   Component Value Date    WBC 10.1 08/09/2021    HGB 17.6 (H) 08/09/2021    HCT 51.0 08/09/2021    MCV 81.9 08/09/2021     08/09/2021     Lab Results   Component Value Date     (L) 08/09/2021    K 4.4 08/09/2021    CL 95 (L) 08/09/2021    CO2 31 08/09/2021    BUN 17 08/09/2021    CREATININE 1.0 08/09/2021    GLUCOSE 107 (H) 08/09/2021    CALCIUM 10.6 08/09/2021    PROT 6.7 08/03/2021    LABALBU 4.4 08/03/2021    BILITOT <0.2 08/03/2021    ALKPHOS 64 08/03/2021    AST 33 08/03/2021    ALT 25 08/03/2021    LABGLOM >60 08/09/2021    GFRAA >60 08/09/2021    AGRATIO 1.9 08/03/2021    GLOB 2.3 08/03/2021     No results found for: INR, PROTIME    8/2021 Nuc Stress:  Overall findings represent a high risk scan.    Normal LV size and systolic function.    Moderate reversible defect involving the mid to basal anterior/anteroseptal    segments concerning for ischemia.    Equivocal EKG response. A/P:  32 y.o. here for cp and abnl stress test. Very young, but had exertional chest burning, 2 ER presentations, and abnormal stress test.  A/P:  Chest pain  Abnormal stress test    Recs:  -Discussed r/b/a to Cath including cta and med mgt. I did rec cath given the symptoms and testing results, and he is agreeable.  -Start asa 81 mg daily.  -Start plavix (load today, 75 mg daily after)  -Start statin  -Cath on Monday. No exertion over weekend. Go to ER immediately/call 911 if symptoms return over weekend. MK Mojica MD, [facility], [facility]

## 2021-08-23 ENCOUNTER — HOSPITAL ENCOUNTER (OUTPATIENT)
Dept: CARDIAC CATH/INVASIVE PROCEDURES | Age: 31
Setting detail: OBSERVATION
Discharge: HOME | End: 2021-08-24
Attending: INTERNAL MEDICINE | Admitting: INTERNAL MEDICINE

## 2021-08-23 DIAGNOSIS — I25.10 CAD IN NATIVE ARTERY: ICD-10-CM

## 2021-08-23 LAB
INR BLD: 1.5 (ref 0.86–1.14)
LEFT VENTRICULAR EJECTION FRACTION HIGH VALUE: 65 %
LEFT VENTRICULAR EJECTION FRACTION MODE: NORMAL
LV EF: 60 %
POC ACT LR: 321 SEC
POC ACT LR: >400 SEC
POC ACT LR: >400 SEC

## 2021-08-23 PROCEDURE — C1874 STENT, COATED/COV W/DEL SYS: HCPCS

## 2021-08-23 PROCEDURE — 99152 MOD SED SAME PHYS/QHP 5/>YRS: CPT

## 2021-08-23 PROCEDURE — 93005 ELECTROCARDIOGRAM TRACING: CPT | Performed by: INTERNAL MEDICINE

## 2021-08-23 PROCEDURE — 2709999900 HC NON-CHARGEABLE SUPPLY

## 2021-08-23 PROCEDURE — 1200000000 HC SEMI PRIVATE

## 2021-08-23 PROCEDURE — 92928 PRQ TCAT PLMT NTRAC ST 1 LES: CPT | Performed by: INTERNAL MEDICINE

## 2021-08-23 PROCEDURE — 2580000003 HC RX 258: Performed by: INTERNAL MEDICINE

## 2021-08-23 PROCEDURE — G0379 DIRECT REFER HOSPITAL OBSERV: HCPCS

## 2021-08-23 PROCEDURE — C1753 CATH, INTRAVAS ULTRASOUND: HCPCS

## 2021-08-23 PROCEDURE — 99152 MOD SED SAME PHYS/QHP 5/>YRS: CPT | Performed by: INTERNAL MEDICINE

## 2021-08-23 PROCEDURE — 6370000000 HC RX 637 (ALT 250 FOR IP): Performed by: INTERNAL MEDICINE

## 2021-08-23 PROCEDURE — C1894 INTRO/SHEATH, NON-LASER: HCPCS

## 2021-08-23 PROCEDURE — 85610 PROTHROMBIN TIME: CPT

## 2021-08-23 PROCEDURE — C1887 CATHETER, GUIDING: HCPCS

## 2021-08-23 PROCEDURE — 85347 COAGULATION TIME ACTIVATED: CPT

## 2021-08-23 PROCEDURE — 6360000004 HC RX CONTRAST MEDICATION: Performed by: INTERNAL MEDICINE

## 2021-08-23 PROCEDURE — 93458 L HRT ARTERY/VENTRICLE ANGIO: CPT | Performed by: INTERNAL MEDICINE

## 2021-08-23 PROCEDURE — C1769 GUIDE WIRE: HCPCS

## 2021-08-23 PROCEDURE — 92978 ENDOLUMINL IVUS OCT C 1ST: CPT

## 2021-08-23 PROCEDURE — 92978 ENDOLUMINL IVUS OCT C 1ST: CPT | Performed by: INTERNAL MEDICINE

## 2021-08-23 PROCEDURE — 92928 PRQ TCAT PLMT NTRAC ST 1 LES: CPT

## 2021-08-23 PROCEDURE — 2500000003 HC RX 250 WO HCPCS

## 2021-08-23 PROCEDURE — 93458 L HRT ARTERY/VENTRICLE ANGIO: CPT

## 2021-08-23 PROCEDURE — 99153 MOD SED SAME PHYS/QHP EA: CPT

## 2021-08-23 PROCEDURE — C1725 CATH, TRANSLUMIN NON-LASER: HCPCS

## 2021-08-23 PROCEDURE — G0378 HOSPITAL OBSERVATION PER HR: HCPCS

## 2021-08-23 PROCEDURE — 6360000002 HC RX W HCPCS

## 2021-08-23 RX ORDER — ASPIRIN 81 MG/1
81 TABLET ORAL DAILY
Status: DISCONTINUED | OUTPATIENT
Start: 2021-08-24 | End: 2021-08-24 | Stop reason: HOSPADM

## 2021-08-23 RX ORDER — SODIUM CHLORIDE 0.9 % (FLUSH) 0.9 %
5-40 SYRINGE (ML) INJECTION PRN
Status: DISCONTINUED | OUTPATIENT
Start: 2021-08-23 | End: 2021-08-24 | Stop reason: HOSPADM

## 2021-08-23 RX ORDER — SODIUM CHLORIDE 9 MG/ML
25 INJECTION, SOLUTION INTRAVENOUS PRN
Status: DISCONTINUED | OUTPATIENT
Start: 2021-08-23 | End: 2021-08-24 | Stop reason: HOSPADM

## 2021-08-23 RX ORDER — ASPIRIN 325 MG
325 TABLET ORAL ONCE
Status: DISCONTINUED | OUTPATIENT
Start: 2021-08-23 | End: 2021-08-24 | Stop reason: HOSPADM

## 2021-08-23 RX ORDER — SODIUM CHLORIDE 9 MG/ML
INJECTION, SOLUTION INTRAVENOUS CONTINUOUS
Status: ACTIVE | OUTPATIENT
Start: 2021-08-23 | End: 2021-08-23

## 2021-08-23 RX ORDER — SODIUM CHLORIDE 0.9 % (FLUSH) 0.9 %
5-40 SYRINGE (ML) INJECTION EVERY 12 HOURS SCHEDULED
Status: DISCONTINUED | OUTPATIENT
Start: 2021-08-23 | End: 2021-08-24 | Stop reason: HOSPADM

## 2021-08-23 RX ORDER — SODIUM CHLORIDE 9 MG/ML
INJECTION, SOLUTION INTRAVENOUS CONTINUOUS
Status: DISCONTINUED | OUTPATIENT
Start: 2021-08-23 | End: 2021-08-24 | Stop reason: HOSPADM

## 2021-08-23 RX ORDER — ATORVASTATIN CALCIUM 40 MG/1
40 TABLET, FILM COATED ORAL DAILY
Status: DISCONTINUED | OUTPATIENT
Start: 2021-08-24 | End: 2021-08-24 | Stop reason: HOSPADM

## 2021-08-23 RX ORDER — ACETAMINOPHEN 325 MG/1
650 TABLET ORAL EVERY 4 HOURS PRN
Status: DISCONTINUED | OUTPATIENT
Start: 2021-08-23 | End: 2021-08-24 | Stop reason: HOSPADM

## 2021-08-23 RX ORDER — CLOPIDOGREL BISULFATE 75 MG/1
75 TABLET ORAL DAILY
Status: DISCONTINUED | OUTPATIENT
Start: 2021-08-24 | End: 2021-08-24 | Stop reason: HOSPADM

## 2021-08-23 RX ADMIN — IOHEXOL 150 ML: 350 INJECTION, SOLUTION INTRAVENOUS at 15:40

## 2021-08-23 RX ADMIN — SODIUM CHLORIDE: 9 INJECTION, SOLUTION INTRAVENOUS at 16:15

## 2021-08-23 ASSESSMENT — PAIN SCALES - GENERAL
PAINLEVEL_OUTOF10: 0

## 2021-08-23 NOTE — PLAN OF CARE
Brief Pre-Op Note/Sedation Assessment      Scar Espinoza Case  1990  [id number]  2:34 PM    Planned Procedure: Cardiac Catheterization Procedure  Post Procedure Plan: Return to same level of care  Consent: I have discussed with the patient and/or the patient representative the indication, alternatives, and the possible risks and/or complications of the planned procedure and the anesthesia methods. The patient and/or patient representative appear to understand and agree to proceed. Chief Complaint:   Chest Pain/Pressure      Indications for Cath Procedure:  1. Presentation:  New Onset Angina <= 2 months  2. Anginal Classification within 2 weeks:  CCS III - Symptoms with everyday living activities, i.e., moderate limitation  3. Angina Symptoms Assessment:  Typical Chest Pain  4. Heart Failure Class within last 2 weeks:  No symptoms  5. Cardiovascular Instability:  No    Prior Ischemic Workup/Eval:  1. Pre-Procedural Medications: Yes: Aspirin and STATIN  2. Stress Test Completed? Yes:  Stress or Imaging Studies Performed (within ANY time period):   Type:  Stress Nuclear  Results:  Positive:  Myocardial Perfusion Defects (Nuclear) Extent of Ischemia:  High Risk (>3% annual death or MI)    Does Patient need surgery? Cath Valve Surgery:  No    Pre-Procedure Medical History:  Vital Signs:  Temp 97.9 °F (36.6 °C) (Oral)   Ht 5' 8\" (1.727 m)   Wt 169 lb (76.7 kg)   BMI 25.70 kg/m²     Allergies:  No Known Allergies  Medications:    Current Outpatient Medications   Medication Sig Dispense Refill    aspirin EC 81 MG EC tablet Take 1 tablet by mouth daily 90 tablet 1    clopidogrel (PLAVIX) 75 MG tablet Take 1 tablet by mouth daily Take 4 pills (300 mg) on day 1, then 1 pill daily. 30 tablet 0    atorvastatin (LIPITOR) 40 MG tablet Take 1 tablet by mouth daily 30 tablet 0    buprenorphine-naloxone (SUBOXONE) 8-2 MG FILM SL film Place 1 Film under the tongue daily.       famotidine (PEPCID) 20 MG Kyaw Serra MD on 8/23/2021 at 2:34 PM

## 2021-08-23 NOTE — PROCEDURES
Naz Morales Case  32 y.o.  [id number]    Referring MD:  Divya Ramirez    Indication:  Chest pain, abnormal stress test    Mallampati Class: 2    ASA Class: 3    After informed consent was obtained and history and exam reviewed, the patient was taken to the cardiac cath lab. The patient had both groins and the right wrist prepped and draped in sterile fashion. 1% Xylocaine was used to anesthetize the right wrist.  A needle was inserted into the radial artery and a 5/6 Fr sheath was inserted. A solution of 2.5 mg verapamil, 200 mcg nitroglycerin, and 3,000U of heparin was administered via the sheath. Continuous pulse-oximetry and ECG monitoring was performed, and frequent blood pressure assessment was performed. An independent trained observer pushed medications at my direction. We monitored the patient's level of consciousness and vital signs/physiologic status throughout the procedure duration (see start and stop times below). A JR4 catheter was advanced through the sheath over a guide wire and advanced under fluoroscopic guidance into the ascending aorta. The wire was removed and the left main coronary artery was engaged. Digital angiograms were recorded. The catheter was then removed and used to engage the right coronary artery was engaged. Digital angiograms were recorded. We then gave heparin for goal ACT of 250 seconds or higher. We then exchanged the catheter for an EBU 3.0 guide catheter. The left main coronary artery was engaged. Angiography performed. We wired the LAD with a Runthrough wire. We performed IVUS with a [employer] catheter; note that the IVUS catheter was semi-occlusive of the artery. IVUS performed demonstrating a distal lumen diameter of ~3.5 mm. IVUS removed. We direct stented the LAD with a 3.5 x 23 mm Xience JONEL to 14 CODY. Angiography performed. Repeat IVUS performed.      We then post-dilated the mid LAD stent with a 4.0 x 15 mm balloon to 14 CODY; we then post-dilated the proximal and mid stent with the same balloon to 16 CODY. Note that IC NTG given twice during the procedrue (150 mcg each). Angiography, then wire-out angiography performed. The catheter was then removed, and a pigtail catheter was advanced over the wire to the ascending aorta. The pigtail catheter was then placed into the left ventricle. Pressures were recorded. Ventriculography was performed. Post-ventriculography pressures and a pullback were performed. The pigtail catheter was then removed. All catheter exchanges done over a wire. A femoral angiogram was not performed. Hemostasis was obtained by TR Band    Complications: None    Estimated blood loss: < 50 mL    Sedation: 1 mg Versed, 25 mcg Fentanyl  Sedation start: 1435  Sedation stop: 1540    Angiographic Findings:  Right dominant system  Left Main:  Normal    Left Anterior Descending:  Mid LAD 80-90% stenosis stented to < 10% with a 3.5 x 23 mm Xience post-dilated with a 4.0 x 15 mm NC balloon. There is a large jailed diagonal probably with a 50% stenosis in it; CAESAR 3 flow. Circumflex:  Normal    Right Coronary:  Normal    Left Ventriculogram:  LVEF 60-65%    Hemodynamics (mm Hg):  Left Ventricular Pressure:  133/0, 8  Central Aortic Pressure:  130/80 (102)    Conclusions:  -Severe single vessel (LAD) disease.  -1 JONEL to the LAD    Recommendations:  -ASA and plavix  -Statin  -No smoking or other drugs. -Given the hour of the day, will watch over night. Raenette Michaels A. Renaye Aase, MD, [address], [place]

## 2021-08-23 NOTE — H&P
32 y.o. here for cath. Usually active. Exercised prior to recent pain. Has left sided chest pain. Spreads to arm and back. Happens with exertion but he hasn't been doing much in the last few days so hasn't had it. Pain is a burning. Been going on for 2+ weeks. Started suddenly. Started while cutting the grass. Had a stress test 8/18/2021 that was abnormal and he is here now for a cath after a consult done last week. He has been on plavix. Interestingly, no chest pain with the stress test despite the moderate sized reversible defect. Has been to the ER twice for this. Past Medical History:   Diagnosis Date    Hypertension      No past surgical history on file. Social History     Tobacco Use    Smoking status: Current Every Day Smoker     Packs/day: 1.00     Types: Cigarettes    Smokeless tobacco: Never Used   Substance Use Topics    Alcohol use: Yes     Comment: 2-3 x week    Drug use: Not Currently     Types: Opiates      Comment: snorting   1 ppd x 9-10 years. No Known Allergies    FH:  No Fh of premature CAD. Review of Systems   General: No fevers, chills, fatigue, or night sweats. No abnormal changes in weight. HEENT: No blurry or decreased vision. No changes in hearing, nasal discharge or sore throat. Cardiovascular: See HPI. No cramping in legs or buttocks when walking. Respiratory: No cough, hemoptysis, or wheezing. No history of asthma. Gastrointestinal: No abdominal pain, hematochezia, melana, or history of GI ulcers. Genito-Urinary: No dysuria or hematuria. No urgency or polyuria. Musculoskeletal: No complaints of joint pain, joint swelling or muscular weakness/soreness. Neurological: No dizziness or headaches. No numbness/tingling, speech problems or weakness. No history of a stroke or TIA. Psychological: No anxiety or depression  Hematological and Lymphatic: No abnormal bleeding or bruising, blood clots, jaundice.    Endocrine: No malaise/lethargy, palpitations, polydipsia/polyuria, temperature intolerance or unexpected weight changes. Skin: No rashes or non-healing ulcers. PE:  Temperature 97.9 °F (36.6 °C), temperature source Oral, height 5' 8\" (1.727 m), weight 169 lb (76.7 kg). General (appearance): Well devel. No distress  Eyes: Anicteric. EOMI. Neck: Supple. No JVD  Ears/Nose/Mouth/Thorat: No cyanosis  CV: RRR. No m/r/g   Respiratory:  Clear B, normal respiratory effort  GI: abd s/nt/nd  Skin: Warm, dry. No rashes  Neuro/Psych: Alert and oriented x 3. Appropriate behavior  Ext:  No c/c. No edema  Pulses:  2+ radial    Lab Results   Component Value Date    WBC 10.1 08/09/2021    HGB 17.6 (H) 08/09/2021    HCT 51.0 08/09/2021    MCV 81.9 08/09/2021     08/09/2021     Lab Results   Component Value Date     (L) 08/09/2021    K 4.4 08/09/2021    CL 95 (L) 08/09/2021    CO2 31 08/09/2021    BUN 17 08/09/2021    CREATININE 1.0 08/09/2021    GLUCOSE 107 (H) 08/09/2021    CALCIUM 10.6 08/09/2021    PROT 6.7 08/03/2021    LABALBU 4.4 08/03/2021    BILITOT <0.2 08/03/2021    ALKPHOS 64 08/03/2021    AST 33 08/03/2021    ALT 25 08/03/2021    LABGLOM >60 08/09/2021    GFRAA >60 08/09/2021    AGRATIO 1.9 08/03/2021    GLOB 2.3 08/03/2021     Lab Results   Component Value Date    INR 1.50 (H) 08/23/2021 8/2021 Nuc Stress:  Overall findings represent a high risk scan.    Normal LV size and systolic function.    Moderate reversible defect involving the mid to basal anterior/anteroseptal    segments concerning for ischemia.    Equivocal EKG response. A/P:  32 y.o. here for cp and abnl stress test. Very young, but had exertional chest burning, 2 ER presentations, and abnormal stress test.  A/P:  Chest pain  Abnormal stress test    Recs:  -Cath today. Sarah Van MD, [facility]

## 2021-08-24 VITALS
SYSTOLIC BLOOD PRESSURE: 134 MMHG | DIASTOLIC BLOOD PRESSURE: 94 MMHG | WEIGHT: 169 LBS | OXYGEN SATURATION: 97 % | TEMPERATURE: 98.6 F | BODY MASS INDEX: 25.61 KG/M2 | HEART RATE: 64 BPM | RESPIRATION RATE: 16 BRPM | HEIGHT: 68 IN

## 2021-08-24 LAB
ANION GAP SERPL CALCULATED.3IONS-SCNC: 12 MMOL/L (ref 3–16)
BUN BLDV-MCNC: 13 MG/DL (ref 7–20)
CALCIUM SERPL-MCNC: 9.4 MG/DL (ref 8.3–10.6)
CHLORIDE BLD-SCNC: 96 MMOL/L (ref 99–110)
CO2: 27 MMOL/L (ref 21–32)
CREAT SERPL-MCNC: 0.9 MG/DL (ref 0.9–1.3)
EKG ATRIAL RATE: 55 BPM
EKG ATRIAL RATE: 77 BPM
EKG DIAGNOSIS: NORMAL
EKG DIAGNOSIS: NORMAL
EKG P AXIS: 68 DEGREES
EKG P AXIS: 76 DEGREES
EKG P-R INTERVAL: 178 MS
EKG P-R INTERVAL: 208 MS
EKG Q-T INTERVAL: 366 MS
EKG Q-T INTERVAL: 418 MS
EKG QRS DURATION: 86 MS
EKG QRS DURATION: 90 MS
EKG QTC CALCULATION (BAZETT): 399 MS
EKG QTC CALCULATION (BAZETT): 414 MS
EKG R AXIS: 84 DEGREES
EKG R AXIS: 87 DEGREES
EKG T AXIS: 70 DEGREES
EKG T AXIS: 75 DEGREES
EKG VENTRICULAR RATE: 55 BPM
EKG VENTRICULAR RATE: 77 BPM
GFR AFRICAN AMERICAN: >60
GFR NON-AFRICAN AMERICAN: >60
GLUCOSE BLD-MCNC: 93 MG/DL (ref 70–99)
HCT VFR BLD CALC: 47.4 % (ref 40.5–52.5)
HEMOGLOBIN: 16.1 G/DL (ref 13.5–17.5)
MCH RBC QN AUTO: 27.7 PG (ref 26–34)
MCHC RBC AUTO-ENTMCNC: 34 G/DL (ref 31–36)
MCV RBC AUTO: 81.4 FL (ref 80–100)
PDW BLD-RTO: 12.9 % (ref 12.4–15.4)
PLATELET # BLD: 183 K/UL (ref 135–450)
PMV BLD AUTO: 9.2 FL (ref 5–10.5)
POTASSIUM REFLEX MAGNESIUM: 4 MMOL/L (ref 3.5–5.1)
RBC # BLD: 5.82 M/UL (ref 4.2–5.9)
SODIUM BLD-SCNC: 135 MMOL/L (ref 136–145)
WBC # BLD: 10 K/UL (ref 4–11)

## 2021-08-24 PROCEDURE — 93005 ELECTROCARDIOGRAM TRACING: CPT | Performed by: INTERNAL MEDICINE

## 2021-08-24 PROCEDURE — 36415 COLL VENOUS BLD VENIPUNCTURE: CPT

## 2021-08-24 PROCEDURE — 99217 PR OBSERVATION CARE DISCHARGE MANAGEMENT: CPT | Performed by: NURSE PRACTITIONER

## 2021-08-24 PROCEDURE — 6370000000 HC RX 637 (ALT 250 FOR IP): Performed by: INTERNAL MEDICINE

## 2021-08-24 PROCEDURE — 93010 ELECTROCARDIOGRAM REPORT: CPT | Performed by: INTERNAL MEDICINE

## 2021-08-24 PROCEDURE — 85027 COMPLETE CBC AUTOMATED: CPT

## 2021-08-24 PROCEDURE — 80048 BASIC METABOLIC PNL TOTAL CA: CPT

## 2021-08-24 PROCEDURE — G0378 HOSPITAL OBSERVATION PER HR: HCPCS

## 2021-08-24 PROCEDURE — 2580000003 HC RX 258: Performed by: INTERNAL MEDICINE

## 2021-08-24 RX ORDER — ASPIRIN 81 MG/1
81 TABLET ORAL DAILY
Qty: 90 TABLET | Refills: 3 | Status: SHIPPED | OUTPATIENT
Start: 2021-08-24

## 2021-08-24 RX ORDER — ATORVASTATIN CALCIUM 40 MG/1
40 TABLET, FILM COATED ORAL DAILY
Qty: 90 TABLET | Refills: 3 | Status: SHIPPED | OUTPATIENT
Start: 2021-08-24 | End: 2021-09-13

## 2021-08-24 RX ORDER — CLOPIDOGREL BISULFATE 75 MG/1
75 TABLET ORAL DAILY
Qty: 90 TABLET | Refills: 3 | Status: SHIPPED | OUTPATIENT
Start: 2021-08-24

## 2021-08-24 RX ORDER — PANTOPRAZOLE SODIUM 40 MG/1
40 TABLET, DELAYED RELEASE ORAL DAILY
Qty: 90 TABLET | Refills: 3 | Status: SHIPPED | OUTPATIENT
Start: 2021-08-24

## 2021-08-24 RX ORDER — CLOPIDOGREL BISULFATE 75 MG/1
75 TABLET ORAL DAILY
Qty: 90 TABLET | Refills: 3 | Status: SHIPPED | OUTPATIENT
Start: 2021-08-24 | End: 2021-08-24 | Stop reason: SDUPTHER

## 2021-08-24 RX ADMIN — Medication 10 ML: at 10:35

## 2021-08-24 RX ADMIN — ATORVASTATIN CALCIUM 40 MG: 40 TABLET, FILM COATED ORAL at 10:32

## 2021-08-24 RX ADMIN — ASPIRIN 81 MG: 81 TABLET, COATED ORAL at 10:32

## 2021-08-24 RX ADMIN — CLOPIDOGREL BISULFATE 75 MG: 75 TABLET ORAL at 10:33

## 2021-08-24 ASSESSMENT — PAIN SCALES - GENERAL
PAINLEVEL_OUTOF10: 0

## 2021-08-24 NOTE — PLAN OF CARE
Problem: Cardiac:  Goal: Ability to maintain vital signs within normal range will improve  Description: Ability to maintain vital signs within normal range will improve  Outcome: Ongoing  Note: BP (!) 140/85   Pulse 57   Temp 98.7 °F (37.1 °C) (Oral)   Resp 12   Ht 5' 8\" (1.727 m)   Wt 169 lb (76.7 kg)   SpO2 99%   BMI 25.70 kg/m²        Problem: Physical Regulation:  Goal: Complications related to the disease process, condition or treatment will be avoided or minimized  Description: Complications related to the disease process, condition or treatment will be avoided or minimized  Outcome: Ongoing  Note: 1 stent placed in lad through r radial site

## 2021-08-24 NOTE — PROGRESS NOTES
8/18/2021 Echo:   1. Normal LV function with EF 60-65%   2. No regional wall motion abnormalities. 3. Normal diastolic function    Assessment:  32 y.o. with chest pain, abnormal stress test who had an angiogram which demonstrated mid LAD 80-90% stenosis which was stented with 3.5 x 23 mm Xience JONEL  Issues:  -CAD/PCI  -Tobacco use  -hx Opiate abuse      Plan:  Checking CBC  ASA, plavix, statin   No smoking   Follow up with me in 1 week and Dr Abreu Cea in 1 month.

## 2021-08-24 NOTE — DISCHARGE SUMMARY
[address] Discharge Note      Patient ID: Rylan Novak 32 y.o. 1990    Admission Date: 8/23/2021     Discharge Date:  8/24/2021     Reason for Admission:  Principal Diagnosis: coronary artery disease  Secondary Diagnosis:     Procedures:  Coronary angioplasty with stent     Brief Summary of Patient's Course:  32 y.o. with chest pain and an abnormal stress test presented for coronary angiogram. The mid LAD had an 80-90% stenosis which was stented with 3.5 x 23 mm Xience JONEL (post-dilated prox and mid with a 4.0 balloon NC balloon). A TR band was used to obtain hemostasis of the right radial arteriotomy. He was monitored overnight and discharged home the next day. Discharge Condition:  Good    Discharge Instructions: Activity Limitations:  No heavy lifting. Diet:  low fat and low sodium    Follow Up Instructions: To office in: Follow up with Mahsa Myers NP in 1 week and Dr Kyaw Serra in 4 weeks  Instructed Re: Discharge medications, activity and dietary restrictions and follow up appointments were discussed. Discussed the importance of dual anti-platelet therapy     Discharge Medications:   Rylan Novak   Home Medication Instructions MID:[id number]    Printed on:08/24/21 1558     Medication Information                        aspirin EC 81 MG EC tablet  Take 1 tablet by mouth daily             atorvastatin (LIPITOR) 40 MG tablet  Take 1 tablet by mouth daily             buprenorphine-naloxone (SUBOXONE) 8-2 MG FILM SL film  Place 1 Film under the tongue daily.              clopidogrel (PLAVIX) 75 MG tablet  Take 1 tablet by mouth daily             nicotine (NICODERM CQ) 7 MG/24HR  Place 1 patch onto the skin daily             pantoprazole (PROTONIX) 40 MG tablet  Take 1 tablet by mouth daily                                            Attending Physician: Dr. Kyaw Serra  Time Spent on Discharge: greater than 30 minutes

## 2021-08-24 NOTE — DISCHARGE INSTR - DIET

## 2021-08-30 ENCOUNTER — OFFICE VISIT (OUTPATIENT)
Dept: CARDIOLOGY CLINIC | Age: 31
End: 2021-08-30

## 2021-08-30 VITALS
BODY MASS INDEX: 26.61 KG/M2 | WEIGHT: 175 LBS | DIASTOLIC BLOOD PRESSURE: 54 MMHG | SYSTOLIC BLOOD PRESSURE: 110 MMHG | HEART RATE: 34 BPM

## 2021-08-30 DIAGNOSIS — I25.10 CAD S/P PERCUTANEOUS CORONARY ANGIOPLASTY: ICD-10-CM

## 2021-08-30 DIAGNOSIS — Z98.61 CAD S/P PERCUTANEOUS CORONARY ANGIOPLASTY: ICD-10-CM

## 2021-08-30 DIAGNOSIS — E78.2 MIXED HYPERLIPIDEMIA: ICD-10-CM

## 2021-08-30 DIAGNOSIS — I10 ESSENTIAL HYPERTENSION: Primary | ICD-10-CM

## 2021-08-30 PROCEDURE — 99214 OFFICE O/P EST MOD 30 MIN: CPT | Performed by: NURSE PRACTITIONER

## 2021-08-30 RX ORDER — LISINOPRIL 2.5 MG/1
2.5 TABLET ORAL DAILY
Qty: 90 TABLET | Refills: 3 | Status: SHIPPED | OUTPATIENT
Start: 2021-08-30

## 2021-08-30 NOTE — PROGRESS NOTES
CC CAD/PCI    HPI:  32 y.o. patient of Dr Korin Green with HTN and HLD who recently had CAD/PCI of mid LAD. He has burning chest pain prior to stent and since then he has not had any burning. He does have intermittent chest discomfort. He was able to cut the grass without chest pain. He denies sob, LH/dizziness, palpitations or syncope. No LE edema, orthopnea or PND. no fever, chills, n/v/d or GI/Gu bleeding. Notes elevated BP (140's) at home. Denies right wrist pain or numbness. Past Medical History:   Diagnosis Date    CAD S/P percutaneous coronary angioplasty 08/23/2021    mild LAD 80-90% stented with 3.5 x 23 mm Xience JONEL.  Hypertension      No past surgical history on file. No family history on file. Social History     Tobacco Use    Smoking status: Current Every Day Smoker     Packs/day: 1.00     Types: Cigarettes    Smokeless tobacco: Never Used   Substance Use Topics    Alcohol use: Yes     Comment: 2-3 x week    Drug use: Not Currently     Types: Opiates      Comment: snorting     Allergies:Patient has no known allergies. Review of Systems  General: No changes in weight, fatigue, or night sweats. HEENT: No blurry or decreased vision. No changes in hearing, nasal discharge or sore throat. Cardiovascular:  See HPI. Respiratory: No cough, hemoptysis, or wheezing. Gastrointestinal:  No abdominal pain, hematochezia, melana, constipation, diarrhea, or history of GI ulcers. Genito-Urinary: No dysuria or hematuria. No urgency or polyuria. Musculoskeletal:  No complaints of joint pain, joint swelling or muscular weakness/soreness. Neurological:  No dizziness, headaches, numbness/tingling, speech problems or weakness. Psychological:  No anxiety or depression. Hematological and Lymphatic: No abnormal bleeding or bruising, blood clots, jaundice or swollen lymph nodes.   Endocrine:   No malaise/lethargy, palpitations, polydipsia/polyuria, temperature intolerance or unexpected weight changes  Skin: No rashes or non-healing ulcers. Physical Exam:  BP (!) 110/54   Pulse (!) 34   Wt 175 lb (79.4 kg)   BMI 26.61 kg/m²    General (appearance):  No acute distress  Eyes: anicteric   Neck: soft, No JVD  Ears/Nose/Mouth/Thorat: No cyanosis  CV: RRR   Respiratory:  Clear, normal effort  GI: soft, non-tender, non-distended  Skin: Warm, dry. No rashes  Neuro/Psych: Alert and oriented x 3. Appropriate behavior  Ext:  No c/c. No edema  Pulses:  2+ right radial. Right wrist soft, no hematoma, good cap refill.      Weight  Wt Readings from Last 3 Encounters:   21 175 lb (79.4 kg)   21 169 lb (76.7 kg)   21 169 lb 9.6 oz (76.9 kg)          CBC:   Lab Results   Component Value Date    WBC 10.0 2021    HGB 16.1 2021    HCT 47.4 2021    MCV 81.4 2021     2021     BMP:  Lab Results   Component Value Date    CREATININE 0.9 2021    BUN 13 2021     (L) 2021    K 4.0 2021    CL 96 (L) 2021    CO2 27 2021     Mag: No results found for: MG  LIVER PROFILE:   Lab Results   Component Value Date    ALT 25 2021    AST 33 2021    ALKPHOS 64 2021    BILITOT <0.2 2021     PT/INR:   Lab Results   Component Value Date    INR 1.50 (H) 2021     Pro-BNP   Lab Results   Component Value Date    PROBNP <5 2021     LIPIDS:  No components found for: CHLPL  Lab Results   Component Value Date    TRIG 97 2021     Lab Results   Component Value Date    HDL 61 (H) 2021     Lab Results   Component Value Date    LDLCALC 121 (H) 2021     Lab Results   Component Value Date    LABVLDL 19 2021     TSH:No results found for: TSH, N2YNERT, Z0XEMLE, THYROIDAB    IMAGIN/24/2021 ECG: Sinus fish     2021 Angiographic Findings:  Right dominant system  Left Main:  Normal  Left Anterior Descending:  Mid LAD 80-90% stenosis stented to < 10% with a 3.5 x 23 mm Xience post-dilated with a 4.0 x 15 mm NC balloon. There is a large jailed diagonal probably with a 50% stenosis in it; CAESAR 3 flow. Circumflex:  Normal  Right Coronary:  Normal  Left Ventriculogram:  LVEF 60-65%  Hemodynamics (mm Hg):  Left Ventricular Pressure:  133/0, 8  Central Aortic Pressure:  130/80 (102)     8/18/2021 Nuc stress       Overall findings represent a high risk scan.    Normal LV size and systolic function.    Moderate reversible defect involving the mid to basal anterior/anteroseptal    segments concerning for ischemia.    Equivocal EKG response.      8/18/2021 Echo:   1. Normal LV function with EF 60-65%   2. No regional wall motion abnormalities.   3. Normal diastolic function         Medications:   Current Outpatient Medications   Medication Sig Dispense Refill    lisinopril (PRINIVIL;ZESTRIL) 2.5 MG tablet Take 1 tablet by mouth daily 90 tablet 3    pantoprazole (PROTONIX) 40 MG tablet Take 1 tablet by mouth daily 90 tablet 3    aspirin EC 81 MG EC tablet Take 1 tablet by mouth daily 90 tablet 3    atorvastatin (LIPITOR) 40 MG tablet Take 1 tablet by mouth daily 90 tablet 3    clopidogrel (PLAVIX) 75 MG tablet Take 1 tablet by mouth daily 90 tablet 3    buprenorphine-naloxone (SUBOXONE) 8-2 MG FILM SL film Place 1 Film under the tongue daily.  nicotine (NICODERM CQ) 7 MG/24HR Place 1 patch onto the skin daily (Patient not taking: Reported on 8/30/2021) 30 patch 3     No current facility-administered medications for this visit. Assessment:  1. Essential hypertension    2. Mixed hyperlipidemia    3.  CAD S/P percutaneous coronary angioplasty          Plan:  CAD/PCI; acute   Asa, plavix   lipitor  HTN; acute   /54, then 148/84   Home BP 140s   Start lisinopril 2.5 mg po daily   BMP in 1 week  HLD; stable    Lipitor    Follow up in 1 month        Reviewed most recent: CBC, BMP, LFT, Lipids, PT/INR, BNP,   Reviewed most recent: ECG, LHC, NUC, Echo

## 2021-09-13 RX ORDER — ATORVASTATIN CALCIUM 40 MG/1
TABLET, FILM COATED ORAL
Qty: 30 TABLET | Refills: 3 | Status: SHIPPED | OUTPATIENT
Start: 2021-09-13

## 2021-10-13 ENCOUNTER — OFFICE VISIT (OUTPATIENT)
Dept: CARDIOLOGY CLINIC | Age: 31
End: 2021-10-13

## 2021-10-13 VITALS
OXYGEN SATURATION: 96 % | SYSTOLIC BLOOD PRESSURE: 128 MMHG | HEIGHT: 68 IN | HEART RATE: 84 BPM | WEIGHT: 177.2 LBS | DIASTOLIC BLOOD PRESSURE: 78 MMHG | BODY MASS INDEX: 26.86 KG/M2

## 2021-10-13 DIAGNOSIS — E78.2 MIXED HYPERLIPIDEMIA: ICD-10-CM

## 2021-10-13 DIAGNOSIS — I10 ESSENTIAL HYPERTENSION: ICD-10-CM

## 2021-10-13 DIAGNOSIS — I25.10 CAD S/P PERCUTANEOUS CORONARY ANGIOPLASTY: Primary | ICD-10-CM

## 2021-10-13 DIAGNOSIS — Z98.61 CAD S/P PERCUTANEOUS CORONARY ANGIOPLASTY: Primary | ICD-10-CM

## 2021-10-13 LAB
A/G RATIO: 2 (ref 1.1–2.2)
ALBUMIN SERPL-MCNC: 4.9 G/DL (ref 3.4–5)
ALP BLD-CCNC: 84 U/L (ref 40–129)
ALT SERPL-CCNC: 71 U/L (ref 10–40)
ANION GAP SERPL CALCULATED.3IONS-SCNC: 14 MMOL/L (ref 3–16)
AST SERPL-CCNC: 42 U/L (ref 15–37)
BILIRUB SERPL-MCNC: 0.7 MG/DL (ref 0–1)
BUN BLDV-MCNC: 11 MG/DL (ref 7–20)
CALCIUM SERPL-MCNC: 9.8 MG/DL (ref 8.3–10.6)
CHLORIDE BLD-SCNC: 100 MMOL/L (ref 99–110)
CO2: 26 MMOL/L (ref 21–32)
CREAT SERPL-MCNC: 1.1 MG/DL (ref 0.9–1.3)
GFR AFRICAN AMERICAN: >60
GFR NON-AFRICAN AMERICAN: >60
GLOBULIN: 2.5 G/DL
GLUCOSE BLD-MCNC: 88 MG/DL (ref 70–99)
POTASSIUM SERPL-SCNC: 4.3 MMOL/L (ref 3.5–5.1)
SODIUM BLD-SCNC: 140 MMOL/L (ref 136–145)
TOTAL PROTEIN: 7.4 G/DL (ref 6.4–8.2)

## 2021-10-13 PROCEDURE — 99214 OFFICE O/P EST MOD 30 MIN: CPT | Performed by: INTERNAL MEDICINE

## 2021-10-13 NOTE — PROGRESS NOTES
32 y.o. here for f/u. Had LAD stent8/23/2021. Since PCI, no more chest pain. No n/v/LH/dizziness. No syncope. Gets a different kind of discomfort--random discomfort but not like pain prior to his stent. No LE edema. No orthopnea or pnd. Takes plavix and asa q day. No orthopnea or PND. Started smoking cigarettes again. Past Medical History:   Diagnosis Date    CAD S/P percutaneous coronary angioplasty 08/23/2021    mild LAD 80-90% stented with 3.5 x 23 mm Xience JONEL.  Hypertension      No past surgical history on file. Social History     Tobacco Use    Smoking status: Current Every Day Smoker     Packs/day: 1.00     Types: Cigarettes    Smokeless tobacco: Never Used   Substance Use Topics    Alcohol use: Yes     Comment: 2-3 x week    Drug use: Not Currently     Types: Opiates      Comment: snorting   1 ppd x 9-10 years. No Known Allergies    FH:  No Fh of premature CAD. Review of Systems   General: No fevers, chills, fatigue, or night sweats. No abnormal changes in weight. HEENT: No blurry or decreased vision. No changes in hearing, nasal discharge or sore throat. Cardiovascular: See HPI. No cramping in legs or buttocks when walking. Respiratory: No cough, hemoptysis, or wheezing. No history of asthma. Gastrointestinal: No abdominal pain, hematochezia, melana, or history of GI ulcers. Genito-Urinary: No dysuria or hematuria. No urgency or polyuria. Musculoskeletal: No complaints of joint pain, joint swelling or muscular weakness/soreness. Neurological: No dizziness or headaches. No numbness/tingling, speech problems or weakness. No history of a stroke or TIA. Psychological: No anxiety or depression  Hematological and Lymphatic: No abnormal bleeding or bruising, blood clots, jaundice. Endocrine: No malaise/lethargy, palpitations, polydipsia/polyuria, temperature intolerance or unexpected weight changes. Skin: No rashes or non-healing ulcers.     PE:  Blood pressure 128/78, pulse 84, height 5' 8\" (1.727 m), weight 177 lb 3.2 oz (80.4 kg), SpO2 96 %. General (appearance): Well devel. No distress  Eyes: Anicteric. EOMI  Neck: Supple. No jvd  Ears/Nose/Mouth/Thorat: No cyanosis  CV: RRR. No m/r/g   Respiratory:  Clear b, normal respiratory effort  GI: abd s/nt/nd  Skin: Warm, dry. No rashes  Neuro/Psych: Alert and oriented x 3. Appropriate behavior  Ext:  No c/c. No edema  Pulses:  2+ radial      Lab Results   Component Value Date    WBC 10.0 08/24/2021    HGB 16.1 08/24/2021    HCT 47.4 08/24/2021    MCV 81.4 08/24/2021     08/24/2021     Lab Results   Component Value Date     (L) 08/24/2021    K 4.0 08/24/2021    CL 96 (L) 08/24/2021    CO2 27 08/24/2021    BUN 13 08/24/2021    CREATININE 0.9 08/24/2021    GLUCOSE 93 08/24/2021    CALCIUM 9.4 08/24/2021    PROT 6.7 08/03/2021    LABALBU 4.4 08/03/2021    BILITOT <0.2 08/03/2021    ALKPHOS 64 08/03/2021    AST 33 08/03/2021    ALT 25 08/03/2021    LABGLOM >60 08/24/2021    GFRAA >60 08/24/2021    AGRATIO 1.9 08/03/2021    GLOB 2.3 08/03/2021     Lab Results   Component Value Date    INR 1.50 (H) 08/23/2021 8/23/2021 LHC:  Angiographic Findings:  Right dominant system  Left Main:  Normal  Left Anterior Descending:  Mid LAD 80-90% stenosis stented to < 10% with a 3.5 x 23 mm Xience post-dilated with a 4.0 x 15 mm NC balloon. There is a large jailed diagonal probably with a 50% stenosis in it; CAESAR 3 flow. Circumflex:  Normal  Right Coronary:  Normal  Left Ventriculogram:  LVEF 60-65%    8/2021 Nuc Stress:  Overall findings represent a high risk scan.    Normal LV size and systolic function.    Moderate reversible defect involving the mid to basal anterior/anteroseptal    segments concerning for ischemia.    Equivocal EKG response.        Current Outpatient Medications:     atorvastatin (LIPITOR) 40 MG tablet, TAKE 1 TABLET BY MOUTH EVERY DAY, Disp: 30 tablet, Rfl: 3    lisinopril (PRINIVIL;ZESTRIL) 2.5 MG tablet, Take 1 tablet by mouth daily, Disp: 90 tablet, Rfl: 3    pantoprazole (PROTONIX) 40 MG tablet, Take 1 tablet by mouth daily, Disp: 90 tablet, Rfl: 3    aspirin EC 81 MG EC tablet, Take 1 tablet by mouth daily, Disp: 90 tablet, Rfl: 3    clopidogrel (PLAVIX) 75 MG tablet, Take 1 tablet by mouth daily, Disp: 90 tablet, Rfl: 3    buprenorphine-naloxone (SUBOXONE) 8-2 MG FILM SL film, Place 1 Film under the tongue daily. , Disp: , Rfl:     nicotine (NICODERM CQ) 7 MG/24HR, Place 1 patch onto the skin daily (Patient not taking: Reported on 8/30/2021), Disp: 30 patch, Rfl: 3    A/P:  32 y.o. here for f/u on CAD. Had LAD JONEL in August 2021. A/P:  1. CAD S/P percutaneous coronary angioplasty    2. Essential hypertension    3. Mixed hyperlipidemia      Recs:  -ASA and plavix  -Atorvastatin  -Lisinopril  -F/U 6 mo  -Check BMP  -Stop smoking, stop drugs (ceboxone)    Tamie Abdi MD, [facility]

## 2021-10-20 ENCOUNTER — TELEPHONE (OUTPATIENT)
Dept: CARDIOLOGY CLINIC | Age: 31
End: 2021-10-20

## 2021-10-20 DIAGNOSIS — I25.10 CAD IN NATIVE ARTERY: Primary | ICD-10-CM

## 2022-01-17 DIAGNOSIS — I10 ESSENTIAL HYPERTENSION: ICD-10-CM

## 2022-01-17 DIAGNOSIS — I25.10 CAD IN NATIVE ARTERY: ICD-10-CM

## 2022-01-17 LAB
ANION GAP SERPL CALCULATED.3IONS-SCNC: 11 MMOL/L (ref 3–16)
BUN BLDV-MCNC: 12 MG/DL (ref 7–20)
CALCIUM SERPL-MCNC: 10 MG/DL (ref 8.3–10.6)
CHLORIDE BLD-SCNC: 101 MMOL/L (ref 99–110)
CO2: 28 MMOL/L (ref 21–32)
CREAT SERPL-MCNC: 0.9 MG/DL (ref 0.9–1.3)
GFR AFRICAN AMERICAN: >60
GFR NON-AFRICAN AMERICAN: >60
GLUCOSE BLD-MCNC: 93 MG/DL (ref 70–99)
POTASSIUM SERPL-SCNC: 4.5 MMOL/L (ref 3.5–5.1)
SODIUM BLD-SCNC: 140 MMOL/L (ref 136–145)
TOTAL CK: 295 U/L (ref 39–308)

## 2022-03-06 ENCOUNTER — APPOINTMENT (OUTPATIENT)
Dept: GENERAL RADIOLOGY | Age: 32
End: 2022-03-06

## 2022-03-06 ENCOUNTER — HOSPITAL ENCOUNTER (OUTPATIENT)
Age: 32
Setting detail: OBSERVATION
LOS: 1 days | Discharge: HOME | End: 2022-03-07
Attending: EMERGENCY MEDICINE | Admitting: INTERNAL MEDICINE

## 2022-03-06 DIAGNOSIS — R07.9 CHEST PAIN, UNSPECIFIED TYPE: Primary | ICD-10-CM

## 2022-03-06 LAB
A/G RATIO: 2 (ref 1.1–2.2)
ALBUMIN SERPL-MCNC: 4.7 G/DL (ref 3.4–5)
ALP BLD-CCNC: 72 U/L (ref 40–129)
ALT SERPL-CCNC: 52 U/L (ref 10–40)
ANION GAP SERPL CALCULATED.3IONS-SCNC: 9 MMOL/L (ref 3–16)
AST SERPL-CCNC: 33 U/L (ref 15–37)
BASOPHILS ABSOLUTE: 0.2 K/UL (ref 0–0.2)
BASOPHILS RELATIVE PERCENT: 3 %
BILIRUB SERPL-MCNC: 0.4 MG/DL (ref 0–1)
BILIRUBIN URINE: NEGATIVE
BLOOD, URINE: NEGATIVE
BUN BLDV-MCNC: 15 MG/DL (ref 7–20)
CALCIUM SERPL-MCNC: 9.2 MG/DL (ref 8.3–10.6)
CHLORIDE BLD-SCNC: 106 MMOL/L (ref 99–110)
CLARITY: CLEAR
CO2: 26 MMOL/L (ref 21–32)
COLOR: YELLOW
CREAT SERPL-MCNC: 0.8 MG/DL (ref 0.9–1.3)
EOSINOPHILS ABSOLUTE: 0.2 K/UL (ref 0–0.6)
EOSINOPHILS RELATIVE PERCENT: 2.6 %
GFR AFRICAN AMERICAN: >60
GFR NON-AFRICAN AMERICAN: >60
GLUCOSE BLD-MCNC: 98 MG/DL (ref 70–99)
GLUCOSE URINE: NEGATIVE MG/DL
HCT VFR BLD CALC: 45.5 % (ref 40.5–52.5)
HEMOGLOBIN: 15.2 G/DL (ref 13.5–17.5)
INR BLD: 1.08 (ref 0.88–1.12)
KETONES, URINE: ABNORMAL MG/DL
LEUKOCYTE ESTERASE, URINE: NEGATIVE
LIPASE: 32 U/L (ref 13–60)
LYMPHOCYTES ABSOLUTE: 1.6 K/UL (ref 1–5.1)
LYMPHOCYTES RELATIVE PERCENT: 22.9 %
MCH RBC QN AUTO: 27 PG (ref 26–34)
MCHC RBC AUTO-ENTMCNC: 33.4 G/DL (ref 31–36)
MCV RBC AUTO: 80.9 FL (ref 80–100)
MICROSCOPIC EXAMINATION: ABNORMAL
MONOCYTES ABSOLUTE: 0.5 K/UL (ref 0–1.3)
MONOCYTES RELATIVE PERCENT: 6.5 %
NEUTROPHILS ABSOLUTE: 4.7 K/UL (ref 1.7–7.7)
NEUTROPHILS RELATIVE PERCENT: 65 %
NITRITE, URINE: NEGATIVE
PDW BLD-RTO: 13.7 % (ref 12.4–15.4)
PH UA: 5.5 (ref 5–8)
PLATELET # BLD: 207 K/UL (ref 135–450)
PMV BLD AUTO: 8.9 FL (ref 5–10.5)
POTASSIUM REFLEX MAGNESIUM: 4.1 MMOL/L (ref 3.5–5.1)
PRO-BNP: 10 PG/ML (ref 0–124)
PROTEIN UA: NEGATIVE MG/DL
PROTHROMBIN TIME: 12.2 SEC (ref 9.9–12.7)
RBC # BLD: 5.63 M/UL (ref 4.2–5.9)
SODIUM BLD-SCNC: 141 MMOL/L (ref 136–145)
SPECIFIC GRAVITY UA: >=1.03 (ref 1–1.03)
TOTAL PROTEIN: 7.1 G/DL (ref 6.4–8.2)
TROPONIN: <0.01 NG/ML
TROPONIN: <0.01 NG/ML
URINE REFLEX TO CULTURE: ABNORMAL
URINE TYPE: ABNORMAL
UROBILINOGEN, URINE: 0.2 E.U./DL
WBC # BLD: 7.2 K/UL (ref 4–11)

## 2022-03-06 PROCEDURE — 99284 EMERGENCY DEPT VISIT MOD MDM: CPT

## 2022-03-06 PROCEDURE — 96372 THER/PROPH/DIAG INJ SC/IM: CPT

## 2022-03-06 PROCEDURE — 36415 COLL VENOUS BLD VENIPUNCTURE: CPT

## 2022-03-06 PROCEDURE — 2580000003 HC RX 258: Performed by: INTERNAL MEDICINE

## 2022-03-06 PROCEDURE — 84484 ASSAY OF TROPONIN QUANT: CPT

## 2022-03-06 PROCEDURE — 85025 COMPLETE CBC W/AUTO DIFF WBC: CPT

## 2022-03-06 PROCEDURE — 83880 ASSAY OF NATRIURETIC PEPTIDE: CPT

## 2022-03-06 PROCEDURE — 85610 PROTHROMBIN TIME: CPT

## 2022-03-06 PROCEDURE — 93005 ELECTROCARDIOGRAM TRACING: CPT | Performed by: EMERGENCY MEDICINE

## 2022-03-06 PROCEDURE — G0378 HOSPITAL OBSERVATION PER HR: HCPCS

## 2022-03-06 PROCEDURE — 81003 URINALYSIS AUTO W/O SCOPE: CPT

## 2022-03-06 PROCEDURE — 71045 X-RAY EXAM CHEST 1 VIEW: CPT

## 2022-03-06 PROCEDURE — 6370000000 HC RX 637 (ALT 250 FOR IP): Performed by: EMERGENCY MEDICINE

## 2022-03-06 PROCEDURE — 83690 ASSAY OF LIPASE: CPT

## 2022-03-06 PROCEDURE — 80053 COMPREHEN METABOLIC PANEL: CPT

## 2022-03-06 PROCEDURE — 6360000002 HC RX W HCPCS: Performed by: INTERNAL MEDICINE

## 2022-03-06 RX ORDER — POLYETHYLENE GLYCOL 3350 17 G/17G
17 POWDER, FOR SOLUTION ORAL DAILY PRN
Status: DISCONTINUED | OUTPATIENT
Start: 2022-03-06 | End: 2022-03-07 | Stop reason: HOSPADM

## 2022-03-06 RX ORDER — SODIUM CHLORIDE 9 MG/ML
25 INJECTION, SOLUTION INTRAVENOUS PRN
Status: DISCONTINUED | OUTPATIENT
Start: 2022-03-06 | End: 2022-03-07 | Stop reason: HOSPADM

## 2022-03-06 RX ORDER — ATORVASTATIN CALCIUM 40 MG/1
40 TABLET, FILM COATED ORAL DAILY
Status: DISCONTINUED | OUTPATIENT
Start: 2022-03-07 | End: 2022-03-07 | Stop reason: HOSPADM

## 2022-03-06 RX ORDER — ONDANSETRON 4 MG/1
4 TABLET, ORALLY DISINTEGRATING ORAL EVERY 8 HOURS PRN
Status: DISCONTINUED | OUTPATIENT
Start: 2022-03-06 | End: 2022-03-07 | Stop reason: HOSPADM

## 2022-03-06 RX ORDER — ACETAMINOPHEN 650 MG/1
650 SUPPOSITORY RECTAL EVERY 6 HOURS PRN
Status: DISCONTINUED | OUTPATIENT
Start: 2022-03-06 | End: 2022-03-07 | Stop reason: HOSPADM

## 2022-03-06 RX ORDER — ONDANSETRON 2 MG/ML
4 INJECTION INTRAMUSCULAR; INTRAVENOUS EVERY 6 HOURS PRN
Status: DISCONTINUED | OUTPATIENT
Start: 2022-03-06 | End: 2022-03-07 | Stop reason: HOSPADM

## 2022-03-06 RX ORDER — ACETAMINOPHEN 325 MG/1
650 TABLET ORAL EVERY 6 HOURS PRN
Status: DISCONTINUED | OUTPATIENT
Start: 2022-03-06 | End: 2022-03-07 | Stop reason: HOSPADM

## 2022-03-06 RX ORDER — BUPRENORPHINE HYDROCHLORIDE AND NALOXONE HYDROCHLORIDE DIHYDRATE 2; .5 MG/1; MG/1
4 TABLET SUBLINGUAL DAILY
Status: DISCONTINUED | OUTPATIENT
Start: 2022-03-06 | End: 2022-03-07 | Stop reason: HOSPADM

## 2022-03-06 RX ORDER — SODIUM CHLORIDE 0.9 % (FLUSH) 0.9 %
5-40 SYRINGE (ML) INJECTION EVERY 12 HOURS SCHEDULED
Status: DISCONTINUED | OUTPATIENT
Start: 2022-03-06 | End: 2022-03-07 | Stop reason: HOSPADM

## 2022-03-06 RX ORDER — ASPIRIN 81 MG/1
81 TABLET, CHEWABLE ORAL DAILY
Status: DISCONTINUED | OUTPATIENT
Start: 2022-03-06 | End: 2022-03-07 | Stop reason: HOSPADM

## 2022-03-06 RX ORDER — CLOPIDOGREL BISULFATE 75 MG/1
75 TABLET ORAL DAILY
Status: DISCONTINUED | OUTPATIENT
Start: 2022-03-07 | End: 2022-03-07 | Stop reason: HOSPADM

## 2022-03-06 RX ORDER — ASPIRIN 325 MG
325 TABLET ORAL ONCE
Status: COMPLETED | OUTPATIENT
Start: 2022-03-06 | End: 2022-03-06

## 2022-03-06 RX ORDER — SODIUM CHLORIDE 0.9 % (FLUSH) 0.9 %
5-40 SYRINGE (ML) INJECTION PRN
Status: DISCONTINUED | OUTPATIENT
Start: 2022-03-06 | End: 2022-03-07 | Stop reason: HOSPADM

## 2022-03-06 RX ORDER — PANTOPRAZOLE SODIUM 40 MG/1
40 TABLET, DELAYED RELEASE ORAL DAILY
Status: DISCONTINUED | OUTPATIENT
Start: 2022-03-07 | End: 2022-03-07 | Stop reason: HOSPADM

## 2022-03-06 RX ADMIN — ASPIRIN 325 MG: 325 TABLET ORAL at 18:12

## 2022-03-06 RX ADMIN — Medication 10 ML: at 21:34

## 2022-03-06 RX ADMIN — ENOXAPARIN SODIUM 40 MG: 100 INJECTION SUBCUTANEOUS at 21:34

## 2022-03-06 ASSESSMENT — ENCOUNTER SYMPTOMS
ABDOMINAL PAIN: 0
SHORTNESS OF BREATH: 0
NAUSEA: 0
EYES NEGATIVE: 1
WHEEZING: 0
COUGH: 0
VOMITING: 0

## 2022-03-06 ASSESSMENT — PAIN SCALES - GENERAL
PAINLEVEL_OUTOF10: 0
PAINLEVEL_OUTOF10: 0

## 2022-03-06 NOTE — ED NOTES
Patient to transfer to [facility] Rm# [phone] nurse to return call for report, [facility] eta 30 mins. Patient updated.      Rosie Malcolm RN  03/06/22 0802

## 2022-03-06 NOTE — ED NOTES
Patient report to [facility] ems patient to the [facility].      Sumit Aguila RN  03/06/22 [id number]

## 2022-03-06 NOTE — ED PROVIDER NOTES
CHIEF COMPLAINT  Chest Pain      HISTORY OF PRESENT ILLNESS  Fredy Ribeiro Case is a 32 y.o. male with a history of CAD status post stenting, hypertension who presents emerge department for evaluation of chest pain. Patient states over the past several days he has had worsening chest pain associated with exertion. He states that on the left side, rated 8 out of 10, worse with exertion radiates to the left shoulder. He denies any nausea, numbness or difficulty breathing. He states that the symptoms are similar to when he received the last stent. He states that he has been compliant with his home medicines. He has not missed any doses. Denies any leg swelling, calf pain. Denies fevers. Past Medical History:   Diagnosis Date    CAD S/P percutaneous coronary angioplasty 08/23/2021    mild LAD 80-90% stented with 3.5 x 23 mm Xience JONEL.  Hypertension      History reviewed. No pertinent surgical history. History reviewed. No pertinent family history. Social History     Socioeconomic History    Marital status: Single     Spouse name: Not on file    Number of children: Not on file    Years of education: Not on file    Highest education level: Not on file   Occupational History    Not on file   Tobacco Use    Smoking status: Current Every Day Smoker     Packs/day: 1.00     Types: Cigarettes    Smokeless tobacco: Never Used   Substance and Sexual Activity    Alcohol use: Yes     Comment: 2-3 x week    Drug use: Not Currently     Types: Opiates      Comment: snorting    Sexual activity: Not on file   Other Topics Concern    Not on file   Social History Narrative    Not on file     Social Determinants of Health     Financial Resource Strain:     Difficulty of Paying Living Expenses: Not on file   Food Insecurity:     Worried About Running Out of Food in the Last Year: Not on file    Venkata of Food in the Last Year: Not on file   Transportation Needs:     Lack of Transportation (Medical):  Not on file    Lack of Transportation (Non-Medical): Not on file   Physical Activity:     Days of Exercise per Week: Not on file    Minutes of Exercise per Session: Not on file   Stress:     Feeling of Stress : Not on file   Social Connections:     Frequency of Communication with Friends and Family: Not on file    Frequency of Social Gatherings with Friends and Family: Not on file    Attends Sabianist Services: Not on file    Active Member of [address] [place] [address] or Organizations: Not on file    Attends Club or Organization Meetings: Not on file    Marital Status: Not on file   Intimate Partner Violence:     Fear of Current or Ex-Partner: Not on file    Emotionally Abused: Not on file    Physically Abused: Not on file    Sexually Abused: Not on file   Housing Stability:     Unable to Pay for Housing in the Last Year: Not on file    Number of [place] in the Last Year: Not on file    Unstable Housing in the Last Year: Not on file     No current facility-administered medications for this encounter. Current Outpatient Medications   Medication Sig Dispense Refill    atorvastatin (LIPITOR) 40 MG tablet TAKE 1 TABLET BY MOUTH EVERY DAY 30 tablet 3    lisinopril (PRINIVIL;ZESTRIL) 2.5 MG tablet Take 1 tablet by mouth daily 90 tablet 3    pantoprazole (PROTONIX) 40 MG tablet Take 1 tablet by mouth daily 90 tablet 3    aspirin EC 81 MG EC tablet Take 1 tablet by mouth daily 90 tablet 3    clopidogrel (PLAVIX) 75 MG tablet Take 1 tablet by mouth daily 90 tablet 3    buprenorphine-naloxone (SUBOXONE) 8-2 MG FILM SL film Place 1 Film under the tongue daily.  nicotine (NICODERM CQ) 7 MG/24HR Place 1 patch onto the skin daily (Patient not taking: Reported on 8/30/2021) 30 patch 3     No Known Allergies    REVIEW OF SYSTEMS  Positive and pertinent negatives as per HPI. All other systems were reviewed and are negative.     PHYSICAL EXAM  /76   Pulse 65   Temp 98.8 °F (37.1 °C) (Oral)   Resp 17   Ht 5' 8\" Bilirubin 0.4 0.0 - 1.0 mg/dL    Alkaline Phosphatase 72 40 - 129 U/L    ALT 52 (H) 10 - 40 U/L    AST 33 15 - 37 U/L   Lipase   Result Value Ref Range    Lipase 32.0 13.0 - 60.0 U/L   Troponin   Result Value Ref Range    Troponin <0.01 <0.01 ng/mL   Brain Natriuretic Peptide   Result Value Ref Range    Pro-BNP 10 0 - 124 pg/mL   Protime-INR   Result Value Ref Range    Protime 12.2 9.9 - 12.7 sec    INR 1.08 0.88 - 1.12   Urinalysis with Reflex to Culture    Specimen: Urine   Result Value Ref Range    Color, UA Yellow Straw/Yellow    Clarity, UA Clear Clear    Glucose, Ur Negative Negative mg/dL    Bilirubin Urine Negative Negative    Ketones, Urine TRACE (A) Negative mg/dL    Specific Gravity, UA >=1.030 1.005 - 1.030    Blood, Urine Negative Negative    pH, UA 5.5 5.0 - 8.0    Protein, UA Negative Negative mg/dL    Urobilinogen, Urine 0.2 <2.0 E.U./dL    Nitrite, Urine Negative Negative    Leukocyte Esterase, Urine Negative Negative    Microscopic Examination Not Indicated     Urine Type NotGiven     Urine Reflex to Culture Not Indicated    EKG 12 Lead   Result Value Ref Range    Ventricular Rate 68 BPM    Atrial Rate 68 BPM    P-R Interval 162 ms    QRS Duration 86 ms    Q-T Interval 376 ms    QTc Calculation (Bazett) 399 ms    P Axis 56 degrees    R Axis 82 degrees    T Axis 62 degrees    Diagnosis Normal sinus rhythmNormal ECG        EKG  The Ekg interpreted by myself in the emergency department in the absence of a cardiologist.  normal sinus rhythm with a rate of 68  Axis is   Normal  QTc is  within an acceptable range  Intervals and Durations are unremarkable. No specific ST-T wave changes appreciated. No evidence of acute ischemia. No significant change from prior EKG dated 8/24/21      RADIOLOGY  X-RAYS:  I have reviewed radiologic plain film image(s). ALL OTHER NON-PLAIN FILM IMAGES SUCH AS CT, ULTRASOUND AND MRI HAVE BEEN READ BY THE RADIOLOGIST. XR CHEST PORTABLE   Final Result   1.  Stable chest with no interval changes. ED COURSE/MDM  Patient seen and evaluated. Old records reviewed. Labs and imaging reviewed and results discussed with patient. 44-year-old male with history of CAD status post stenting presenting for evaluation of chest pain. EKG is nonischemic without dynamic changes compared to prior. ED evaluation for basic labs, cardiac panel, x-ray. Patient is high risk in relation to his chest pain because of his prior history of CAD. He also has exertional component to chest pain. Initial troponin is negative. Chest x-ray without acute process. As patient has prior history of CAD, symptoms consistent with ACS, I have recommended admission for cardiac rule out and cardiology consultation. Patient is agreeable with this. I have spoke with Dr. Mili Anderson who is excepted the patient for admission. Patient transferred in stable condition. CLINICAL IMPRESSION  1. Chest pain, unspecified type        Blood pressure 133/76, pulse 65, temperature 98.8 °F (37.1 °C), temperature source Oral, resp. rate 17, height 5' 8\" (1.727 m), weight 170 lb 4.8 oz (77.2 kg), SpO2 98 %. DISPOSITION  Femi OWEN Case was admitted in stable condition. This chart was generated in part by using Dragon Dictation system and may contain errors related to that system including errors in grammar, punctuation, and spelling, as well as words and phrases that may be inappropriate. If there are any questions or concerns please feel free to contact the dictating provider for clarification.      Lizet Nesbitt MD  03/06/22 [id number]

## 2022-03-07 VITALS
HEART RATE: 68 BPM | DIASTOLIC BLOOD PRESSURE: 88 MMHG | OXYGEN SATURATION: 100 % | RESPIRATION RATE: 16 BRPM | SYSTOLIC BLOOD PRESSURE: 139 MMHG | BODY MASS INDEX: 26.26 KG/M2 | WEIGHT: 173.28 LBS | TEMPERATURE: 98.1 F | HEIGHT: 68 IN

## 2022-03-07 LAB
CHOLESTEROL, TOTAL: 107 MG/DL (ref 0–199)
EKG ATRIAL RATE: 68 BPM
EKG DIAGNOSIS: NORMAL
EKG P AXIS: 56 DEGREES
EKG P-R INTERVAL: 162 MS
EKG Q-T INTERVAL: 376 MS
EKG QRS DURATION: 86 MS
EKG QTC CALCULATION (BAZETT): 399 MS
EKG R AXIS: 82 DEGREES
EKG T AXIS: 62 DEGREES
EKG VENTRICULAR RATE: 68 BPM
HCT VFR BLD CALC: 44.5 % (ref 40.5–52.5)
HDLC SERPL-MCNC: 48 MG/DL (ref 40–60)
HEMOGLOBIN: 15.1 G/DL (ref 13.5–17.5)
LDL CHOLESTEROL CALCULATED: 43 MG/DL
LV EF: 56 %
LVEF MODALITY: NORMAL
MCH RBC QN AUTO: 27.8 PG (ref 26–34)
MCHC RBC AUTO-ENTMCNC: 33.9 G/DL (ref 31–36)
MCV RBC AUTO: 82.1 FL (ref 80–100)
PDW BLD-RTO: 13.4 % (ref 12.4–15.4)
PLATELET # BLD: 199 K/UL (ref 135–450)
PMV BLD AUTO: 8.8 FL (ref 5–10.5)
RBC # BLD: 5.42 M/UL (ref 4.2–5.9)
TRIGL SERPL-MCNC: 78 MG/DL (ref 0–150)
TROPONIN: <0.01 NG/ML
VLDLC SERPL CALC-MCNC: 16 MG/DL
WBC # BLD: 7.5 K/UL (ref 4–11)

## 2022-03-07 PROCEDURE — G0378 HOSPITAL OBSERVATION PER HR: HCPCS

## 2022-03-07 PROCEDURE — 3430000000 HC RX DIAGNOSTIC RADIOPHARMACEUTICAL: Performed by: INTERNAL MEDICINE

## 2022-03-07 PROCEDURE — 84484 ASSAY OF TROPONIN QUANT: CPT

## 2022-03-07 PROCEDURE — 6370000000 HC RX 637 (ALT 250 FOR IP): Performed by: INTERNAL MEDICINE

## 2022-03-07 PROCEDURE — 80061 LIPID PANEL: CPT

## 2022-03-07 PROCEDURE — 93017 CV STRESS TEST TRACING ONLY: CPT

## 2022-03-07 PROCEDURE — A9502 TC99M TETROFOSMIN: HCPCS | Performed by: INTERNAL MEDICINE

## 2022-03-07 PROCEDURE — 2580000003 HC RX 258: Performed by: INTERNAL MEDICINE

## 2022-03-07 PROCEDURE — 78452 HT MUSCLE IMAGE SPECT MULT: CPT

## 2022-03-07 PROCEDURE — 36415 COLL VENOUS BLD VENIPUNCTURE: CPT

## 2022-03-07 PROCEDURE — 85027 COMPLETE CBC AUTOMATED: CPT

## 2022-03-07 PROCEDURE — 93010 ELECTROCARDIOGRAM REPORT: CPT | Performed by: INTERNAL MEDICINE

## 2022-03-07 PROCEDURE — 6360000002 HC RX W HCPCS: Performed by: INTERNAL MEDICINE

## 2022-03-07 RX ADMIN — Medication 10 ML: at 10:25

## 2022-03-07 RX ADMIN — ATORVASTATIN CALCIUM 40 MG: 40 TABLET, FILM COATED ORAL at 10:23

## 2022-03-07 RX ADMIN — PANTOPRAZOLE SODIUM 40 MG: 40 TABLET, DELAYED RELEASE ORAL at 10:23

## 2022-03-07 RX ADMIN — CLOPIDOGREL BISULFATE 75 MG: 75 TABLET ORAL at 10:23

## 2022-03-07 RX ADMIN — BUPRENORPHINE AND NALOXONE 4 TABLET: 2; .5 TABLET SUBLINGUAL at 10:23

## 2022-03-07 RX ADMIN — Medication 10 ML: at 07:42

## 2022-03-07 RX ADMIN — ASPIRIN 81 MG: 81 TABLET, CHEWABLE ORAL at 10:23

## 2022-03-07 RX ADMIN — Medication 10 ML: at 09:25

## 2022-03-07 RX ADMIN — TETROFOSMIN 10 MILLICURIE: 1.38 INJECTION, POWDER, LYOPHILIZED, FOR SOLUTION INTRAVENOUS at 07:42

## 2022-03-07 RX ADMIN — TETROFOSMIN 30 MILLICURIE: 1.38 INJECTION, POWDER, LYOPHILIZED, FOR SOLUTION INTRAVENOUS at 09:25

## 2022-03-07 ASSESSMENT — PAIN SCALES - GENERAL
PAINLEVEL_OUTOF10: 0

## 2022-03-07 NOTE — PROGRESS NOTES
Pt given discharge instructions. PIV removed, Pt removed from telemetry at this time. Pt discharged to home, father at bedside.

## 2022-03-07 NOTE — PLAN OF CARE
Problem: Safety:  Goal: Free from accidental physical injury  Description: Free from accidental physical injury  3/7/2022 0930 by Laurent Grigsby RN  Outcome: Met This Shift  Note: Pt is alert and oriented, able to make needs known. Pt is independent in ambulation and personal care. Problem: Daily Care:  Goal: Daily care needs are met  Description: Daily care needs are met  3/7/2022 0930 by Laurent Grigsby RN  Outcome: Met This Shift     Problem: Pain:  Goal: Patient's pain/discomfort is manageable  Description: Patient's pain/discomfort is manageable  3/7/2022 0930 by Laurent Grigsby RN  Outcome: Met This Shift  Note: Pt has no c/o pain at this time. Problem: Skin Integrity:  Goal: Skin integrity will stabilize  Description: Skin integrity will stabilize  3/7/2022 0930 by Laurent Grigsby RN  Outcome: Met This Shift  Note: Pt has no new skin issues noted.       Problem: Discharge Planning:  Goal: Patients continuum of care needs are met  Description: Patients continuum of care needs are met  3/7/2022 0930 by Laurent Grigsby RN  Outcome: Met This Shift

## 2022-03-07 NOTE — CARE COORDINATION
Patient is from home, independent pta. Patient has no DME needs, states he has been able to afford home medications with [employer] card. Patient has transportation home. No CM needs at this time.         Nicole Cat RN, BSN,   4th Floor Progressive Care Unit  [phone]

## 2022-03-07 NOTE — PLAN OF CARE
Problem: Safety:  Goal: Free from accidental physical injury  Description: Free from accidental physical injury  Outcome: Ongoing  Note: Bed is in the lowest position, gripper socks are in place, room is free from clutter, patient is independent in his room. Standard safety precautions in place and reviewed with patient. Problem: Daily Care:  Goal: Daily care needs are met  Description: Daily care needs are met  Outcome: Ongoing  Note: Patient provided with basic hygiene products. Problem: Pain:  Goal: Patient's pain/discomfort is manageable  Description: Patient's pain/discomfort is manageable  Outcome: Ongoing  Note: Patient has remained pain free this shift.

## 2022-03-07 NOTE — DISCHARGE INSTR - COC
Continuity of Care Form    Patient Name: Aj Miller Case   :  1990  MRN:  [id number]    Admit date:  3/6/2022  Discharge date:  3/7/2022

## 2022-03-07 NOTE — H&P
Hospital Medicine History & Physical      PCP: No primary care provider on file. Date of Admission: 3/6/2022    Date of Service: Pt seen/examined on 3/6/2022 and placed in Observation. Chief Complaint:    Chest pain    History Of Present Illness:    31 yo male w/ pmh of cad s/p stent placement summer of last year presented to an osh for chest pain. 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 He reports that the chest pain wa smore of a pressure, in his left substernal area w/o radiation that would come and go and lasted 1 hour yesterday (which was the longest) and resolved after he took some garlic, ACT42 and potassium that he bought from the pharmacy. He reports that more troublesome has been left arm pain which he describes as a soreness that is not associated w/ the chest pin. The pain radiates to his left shoulder. He states that his previous chest pain at the time of his stent was more of a buring left sided chest pain. Currently denies any pain in his chest or arm. No recent illness. No cough/ congestion/ covid exposure. No fever/ chills. No nausea/ vomiting. Past Medical History:          Diagnosis Date    CAD S/P percutaneous coronary angioplasty 08/23/2021    mild LAD 80-90% stented with 3.5 x 23 mm Xience JONEL.  Hypertension        Past Surgical History:      History reviewed. No pertinent surgical history. Medications Prior to Admission:      Prior to Admission medications    Medication Sig Start Date End Date Taking?  Authorizing Provider   atorvastatin (LIPITOR) 40 MG tablet TAKE 1 TABLET BY MOUTH EVERY DAY 9/13/21  Yes Gladies Spar, APRN - CNP   lisinopril (PRINIVIL;ZESTRIL) 2.5 MG tablet Take 1 tablet by mouth daily 8/30/21  Yes Gladies Spar, APRN - CNP   pantoprazole (PROTONIX) 40 MG tablet Take 1 tablet by mouth daily 8/24/21  Yes Gladies Spar, APRN - CNP   aspirin EC 81 MG EC tablet Take 1 tablet by mouth daily 8/24/21  Yes ANTHONY Freire CNP   clopidogrel (PLAVIX) 75 MG tablet Take 1 tablet by mouth daily 8/24/21  Yes ANTHONY Freire CNP   buprenorphine-naloxone (SUBOXONE) 8-2 MG FILM SL film Place 1 Film under the tongue daily. Yes Historical Provider, MD   nicotine (NICODERM CQ) 7 MG/24HR Place 1 patch onto the skin daily  Patient not taking: Reported on 8/30/2021 8/24/21   ANTHONY Freire CNP       Allergies:  Patient has no known allergies. Social History:        TOBACCO:   reports that he has been smoking cigarettes. He has been smoking about 1.00 pack per day. He has never used smokeless tobacco.  ETOH:   reports current alcohol use. E-Cigarettes/Vaping Use     Questions Responses    E-Cigarette/Vaping Use     Start Date     Passive Exposure     Quit Date     Counseling Given     Comments             Family History:      Denies h/o cad in the family    Review of Systems   Constitutional: Negative for diaphoresis, fatigue and fever. HENT: Negative for congestion. Eyes: Negative. Respiratory: Negative for cough, shortness of breath and wheezing. Cardiovascular: Positive for chest pain. Gastrointestinal: Negative for abdominal pain, nausea and vomiting. Endocrine: Negative. Genitourinary: Negative. Musculoskeletal: Positive for arthralgias and myalgias. Skin: Negative. Neurological: Negative. Hematological: Negative. Psychiatric/Behavioral: Negative. PHYSICAL EXAM PERFORMED:    BP (!) 149/94   Pulse 67   Temp 97.7 °F (36.5 °C) (Oral)   Resp 18   Ht 5' 8\" (1.727 m)   Wt 170 lb 4.8 oz (77.2 kg)   SpO2 97%   BMI 25.89 kg/m²     General appearance:  No apparent distress, appears stated age and cooperative. HEENT:  Normal cephalic, atraumatic without obvious deformity. .  Extra ocular muscles intact. Conjunctivae/corneas clear. Neck: Supple, with full range of motion. Respiratory:  Normal respiratory effort.  Clear to auscultation, bilaterally without Rales/Wheezes/Rhonchi. Cardiovascular:  Regular rate and rhythm with normal S1/S2 without murmurs, rubs or gallops. Abdomen: Soft, non-tender, non-distended with normal bowel sounds. Musculoskeletal:  No clubbing, cyanosis or edema bilaterally. Full range of motion without deformity. Skin: multiple tattoos, no rash   Neurologic:  Neurovascularly intact without any focal sensory/motor deficits. Cranial nerves: II-XII intact, grossly non-focal.  Psychiatric:  Alert and oriented, thought content appropriate, normal insight    Labs:     Recent Labs     03/06/22  1647   WBC 7.2   HGB 15.2   HCT 45.5        Recent Labs     03/06/22  1647      K 4.1      CO2 26   BUN 15   CREATININE 0.8*   CALCIUM 9.2     Recent Labs     03/06/22  1647   AST 33   ALT 52*   BILITOT 0.4   ALKPHOS 72     Recent Labs     03/06/22  1647   INR 1.08     Recent Labs     03/06/22  1647   TROPONINI <0.01       Urinalysis:      Lab Results   Component Value Date    NITRU Negative 03/06/2022    BLOODU Negative 03/06/2022    SPECGRAV >=1.030 03/06/2022    GLUCOSEU Negative 03/06/2022       Radiology:     EKG:  I have reviewed the EKG with the following interpretation: NSR, j point elevation (seen on previous ekg), inverted t waves in V1 and V2 (not seen previously)    XR CHEST PORTABLE   Final Result   1. Stable chest with no interval changes. NM Cardiac Stress Test Nuclear Imaging    (Results Pending)       ASSESSMENT:    Active Hospital Problems    Diagnosis Date Noted    Chest pain [R07.9] 03/06/2022   H/o CAD w/ recent stent < 1 year ago  Tobacco use disorder  Chronic pain- on suboxone      PLAN:  Admit to tele/ obs  Check serial troponin  Consult Cardiology  Stress test in am if trop negative/ npo past midnight  Cont home asa/statin/ plavix/ ace-I. No bb and will not start until stress test done. Nicotine patch re-ordered. PTA suboxone continued.     DVT Prophylaxis: Lovenox  Diet: Diet NPO  ADULT DIET;  Regular; Low Fat/Low Chol/High Fiber/ESSENCE; No Caffeine  Code Status: Full Code    PT/OT Eval Status: independent therefore not needed    Jarrett Soni MD

## 2022-04-14 NOTE — DISCHARGE SUMMARY
Hospital Medicine Discharge Summary    Patient ID: Randy Musa Case      Patient's PCP: No primary care provider on file. Admit Date: 3/6/2022     Discharge Date: 3/7/2022      Admitting Provider: Ugo Pruitt MD     Discharge Provider: Ugo Pruitt MD     Discharge Diagnoses and brief hospital course: Active Hospital Problems    Diagnosis     Chest pain [R07.9]    Admitted to tele/ obs  Serial trops negative. Cards consulted and recommended stress test.  Alex Reich done which was negative for ischemia. Likely musculoskeletal.   Cont home asa/statin/ plavix/ ace-I. Chronic pain:  PTA suboxone continued. Physical Exam Performed:     /88   Pulse 68   Temp 98.1 °F (36.7 °C) (Oral)   Resp 16   Ht 5' 8\" (1.727 m)   Wt 173 lb 4.5 oz (78.6 kg)   SpO2 100%   BMI 26.35 kg/m²       General appearance:  No apparent distress, appears stated age and cooperative. HEENT:  Normal cephalic, atraumatic without obvious deformity. Extra ocular muscles intact. Conjunctivae/corneas clear. Neck: Supple, with full range of motion. Respiratory:  Normal respiratory effort. Clear to auscultation, bilaterally without Rales/Wheezes/Rhonchi. Cardiovascular:  Regular rate and rhythm with normal S1/S2 without murmurs, rubs or gallops. Abdomen: Soft, non-tender  Musculoskeletal:  No clubbing, cyanosis or edema bilaterally. Skin: Skin color, texture, turgor normal.  No rashes or lesions. Neurologic:  Neurovascularly intact without any focal sensory/motor deficits. Cranial nerves: II-XII intact, grossly non-focal.  Psychiatric:  Alert and oriented, thought content appropriate, normal insight      Labs:  For convenience and continuity at follow-up the following most recent labs are provided:      CBC:    Lab Results   Component Value Date    WBC 7.5 03/07/2022    HGB 15.1 03/07/2022    HCT 44.5 03/07/2022     03/07/2022       Renal:    Lab Results   Component Value Date     03/06/2022 K 4.1 03/06/2022     03/06/2022    CO2 26 03/06/2022    BUN 15 03/06/2022    CREATININE 0.8 03/06/2022    CALCIUM 9.2 03/06/2022         Significant Diagnostic Studies    Radiology:   NM Cardiac Stress Test Nuclear Imaging   Final Result      XR CHEST PORTABLE   Final Result   1. Stable chest with no interval changes. Consults:     IP CONSULT TO CARDIOLOGY    Disposition:  Home     Condition at Discharge: Stable    Discharge Instructions/Follow-up:  Cardiology    Code Status:  Prior     Activity: activity as tolerated    Diet: cardiac diet      Discharge Medications:     Discharge Medication List as of 3/7/2022 11:42 AM           Details   atorvastatin (LIPITOR) 40 MG tablet TAKE 1 TABLET BY MOUTH EVERY DAY, Disp-30 tablet, R-3Normal      lisinopril (PRINIVIL;ZESTRIL) 2.5 MG tablet Take 1 tablet by mouth daily, Disp-90 tablet, R-3Normal      pantoprazole (PROTONIX) 40 MG tablet Take 1 tablet by mouth daily, Disp-90 tablet, R-3Print      aspirin EC 81 MG EC tablet Take 1 tablet by mouth daily, Disp-90 tablet, R-3Print      nicotine (NICODERM CQ) 7 MG/24HR Place 1 patch onto the skin daily, Disp-30 patch, R-3Print      clopidogrel (PLAVIX) 75 MG tablet Take 1 tablet by mouth daily, Disp-90 tablet, R-3Print      buprenorphine-naloxone (SUBOXONE) 8-2 MG FILM SL film Place 1 Film under the tongue daily. Historical Med             Time Spent on discharge is more than 20 minutes in the examination, evaluation, counseling and review of medications and discharge plan.       Signed:    Ashley Oviedo MD   4/14/2022

## 2023-11-28 ENCOUNTER — HOSPITAL ENCOUNTER (INPATIENT)
Age: 33
LOS: 1 days | Discharge: HOME | DRG: 287 | End: 2023-11-30
Attending: EMERGENCY MEDICINE | Admitting: STUDENT IN AN ORGANIZED HEALTH CARE EDUCATION/TRAINING PROGRAM
Payer: COMMERCIAL

## 2023-11-28 ENCOUNTER — APPOINTMENT (OUTPATIENT)
Dept: GENERAL RADIOLOGY | Age: 33
DRG: 287 | End: 2023-11-28
Payer: COMMERCIAL

## 2023-11-28 DIAGNOSIS — R07.9 CHEST PAIN, UNSPECIFIED TYPE: Primary | ICD-10-CM

## 2023-11-28 LAB
ANION GAP SERPL CALCULATED.3IONS-SCNC: 11 MMOL/L (ref 3–16)
BASOPHILS # BLD: 0 K/UL (ref 0–0.2)
BASOPHILS NFR BLD: 0.4 %
BUN SERPL-MCNC: 12 MG/DL (ref 7–20)
CALCIUM SERPL-MCNC: 9.4 MG/DL (ref 8.3–10.6)
CHLORIDE SERPL-SCNC: 99 MMOL/L (ref 99–110)
CO2 SERPL-SCNC: 26 MMOL/L (ref 21–32)
CREAT SERPL-MCNC: 1 MG/DL (ref 0.9–1.3)
DEPRECATED RDW RBC AUTO: 13.3 % (ref 12.4–15.4)
EKG ATRIAL RATE: 93 BPM
EKG DIAGNOSIS: NORMAL
EKG P AXIS: 76 DEGREES
EKG P-R INTERVAL: 164 MS
EKG Q-T INTERVAL: 336 MS
EKG QRS DURATION: 82 MS
EKG QTC CALCULATION (BAZETT): 417 MS
EKG R AXIS: 94 DEGREES
EKG T AXIS: 41 DEGREES
EKG VENTRICULAR RATE: 93 BPM
EOSINOPHIL # BLD: 0 K/UL (ref 0–0.6)
EOSINOPHIL NFR BLD: 0.5 %
GFR SERPLBLD CREATININE-BSD FMLA CKD-EPI: >60 ML/MIN/{1.73_M2}
GLUCOSE SERPL-MCNC: 92 MG/DL (ref 70–99)
HCT VFR BLD AUTO: 46 % (ref 40.5–52.5)
HGB BLD-MCNC: 15.6 G/DL (ref 13.5–17.5)
LYMPHOCYTES # BLD: 2 K/UL (ref 1–5.1)
LYMPHOCYTES NFR BLD: 21.3 %
MCH RBC QN AUTO: 27.9 PG (ref 26–34)
MCHC RBC AUTO-ENTMCNC: 33.8 G/DL (ref 31–36)
MCV RBC AUTO: 82.4 FL (ref 80–100)
MONOCYTES # BLD: 0.3 K/UL (ref 0–1.3)
MONOCYTES NFR BLD: 3.5 %
NEUTROPHILS # BLD: 7.1 K/UL (ref 1.7–7.7)
NEUTROPHILS NFR BLD: 74.3 %
PLATELET # BLD AUTO: 236 K/UL (ref 135–450)
PMV BLD AUTO: 9 FL (ref 5–10.5)
POTASSIUM SERPL-SCNC: 4.5 MMOL/L (ref 3.5–5.1)
RBC # BLD AUTO: 5.59 M/UL (ref 4.2–5.9)
SODIUM SERPL-SCNC: 136 MMOL/L (ref 136–145)
TROPONIN, HIGH SENSITIVITY: 10 NG/L (ref 0–22)
TROPONIN, HIGH SENSITIVITY: 12 NG/L (ref 0–22)
WBC # BLD AUTO: 9.6 K/UL (ref 4–11)

## 2023-11-28 PROCEDURE — 80048 BASIC METABOLIC PNL TOTAL CA: CPT

## 2023-11-28 PROCEDURE — G0378 HOSPITAL OBSERVATION PER HR: HCPCS

## 2023-11-28 PROCEDURE — 99285 EMERGENCY DEPT VISIT HI MDM: CPT

## 2023-11-28 PROCEDURE — 84484 ASSAY OF TROPONIN QUANT: CPT

## 2023-11-28 PROCEDURE — 85025 COMPLETE CBC W/AUTO DIFF WBC: CPT

## 2023-11-28 PROCEDURE — 2580000003 HC RX 258

## 2023-11-28 PROCEDURE — 71046 X-RAY EXAM CHEST 2 VIEWS: CPT

## 2023-11-28 PROCEDURE — 93005 ELECTROCARDIOGRAM TRACING: CPT

## 2023-11-28 PROCEDURE — 36415 COLL VENOUS BLD VENIPUNCTURE: CPT

## 2023-11-28 RX ORDER — MAGNESIUM SULFATE IN WATER 40 MG/ML
2000 INJECTION, SOLUTION INTRAVENOUS PRN
Status: DISCONTINUED | OUTPATIENT
Start: 2023-11-28 | End: 2023-11-29 | Stop reason: SDUPTHER

## 2023-11-28 RX ORDER — SODIUM CHLORIDE 0.9 % (FLUSH) 0.9 %
5-40 SYRINGE (ML) INJECTION EVERY 12 HOURS SCHEDULED
Status: DISCONTINUED | OUTPATIENT
Start: 2023-11-28 | End: 2023-11-30 | Stop reason: HOSPADM

## 2023-11-28 RX ORDER — POTASSIUM CHLORIDE 20 MEQ/1
40 TABLET, EXTENDED RELEASE ORAL PRN
Status: DISCONTINUED | OUTPATIENT
Start: 2023-11-28 | End: 2023-11-30 | Stop reason: HOSPADM

## 2023-11-28 RX ORDER — POTASSIUM CHLORIDE 7.45 MG/ML
10 INJECTION INTRAVENOUS PRN
Status: DISCONTINUED | OUTPATIENT
Start: 2023-11-28 | End: 2023-11-30 | Stop reason: HOSPADM

## 2023-11-28 RX ORDER — SODIUM CHLORIDE 9 MG/ML
INJECTION, SOLUTION INTRAVENOUS PRN
Status: DISCONTINUED | OUTPATIENT
Start: 2023-11-28 | End: 2023-11-30 | Stop reason: HOSPADM

## 2023-11-28 RX ORDER — ASPIRIN 81 MG/1
81 TABLET, CHEWABLE ORAL DAILY
Status: DISCONTINUED | OUTPATIENT
Start: 2023-11-29 | End: 2023-11-30 | Stop reason: HOSPADM

## 2023-11-28 RX ORDER — SODIUM CHLORIDE 0.9 % (FLUSH) 0.9 %
5-40 SYRINGE (ML) INJECTION PRN
Status: DISCONTINUED | OUTPATIENT
Start: 2023-11-28 | End: 2023-11-30 | Stop reason: HOSPADM

## 2023-11-28 RX ADMIN — SODIUM CHLORIDE, PRESERVATIVE FREE 10 ML: 5 INJECTION INTRAVENOUS at 23:50

## 2023-11-28 ASSESSMENT — ENCOUNTER SYMPTOMS
VOMITING: 0
CHEST TIGHTNESS: 0
ABDOMINAL DISTENTION: 0
SHORTNESS OF BREATH: 0
CONSTIPATION: 0
NAUSEA: 0
ABDOMINAL PAIN: 0
DIARRHEA: 0
WHEEZING: 0
ALLERGIC/IMMUNOLOGIC NEGATIVE: 1
BACK PAIN: 0
PHOTOPHOBIA: 0
EYE PAIN: 0
COUGH: 0

## 2023-11-28 ASSESSMENT — PAIN - FUNCTIONAL ASSESSMENT: PAIN_FUNCTIONAL_ASSESSMENT: 0-10

## 2023-11-28 ASSESSMENT — PAIN SCALES - GENERAL: PAINLEVEL_OUTOF10: 0

## 2023-11-29 LAB
EKG ATRIAL RATE: 66 BPM
EKG DIAGNOSIS: NORMAL
EKG P AXIS: 64 DEGREES
EKG P-R INTERVAL: 184 MS
EKG Q-T INTERVAL: 382 MS
EKG QRS DURATION: 88 MS
EKG QTC CALCULATION (BAZETT): 400 MS
EKG R AXIS: 89 DEGREES
EKG T AXIS: 47 DEGREES
EKG VENTRICULAR RATE: 66 BPM

## 2023-11-29 PROCEDURE — 2580000003 HC RX 258: Performed by: STUDENT IN AN ORGANIZED HEALTH CARE EDUCATION/TRAINING PROGRAM

## 2023-11-29 PROCEDURE — 6370000000 HC RX 637 (ALT 250 FOR IP): Performed by: STUDENT IN AN ORGANIZED HEALTH CARE EDUCATION/TRAINING PROGRAM

## 2023-11-29 PROCEDURE — 1200000000 HC SEMI PRIVATE

## 2023-11-29 PROCEDURE — 93005 ELECTROCARDIOGRAM TRACING: CPT | Performed by: STUDENT IN AN ORGANIZED HEALTH CARE EDUCATION/TRAINING PROGRAM

## 2023-11-29 PROCEDURE — 93017 CV STRESS TEST TRACING ONLY: CPT

## 2023-11-29 PROCEDURE — A9502 TC99M TETROFOSMIN: HCPCS | Performed by: STUDENT IN AN ORGANIZED HEALTH CARE EDUCATION/TRAINING PROGRAM

## 2023-11-29 PROCEDURE — 6360000002 HC RX W HCPCS: Performed by: STUDENT IN AN ORGANIZED HEALTH CARE EDUCATION/TRAINING PROGRAM

## 2023-11-29 PROCEDURE — 99223 1ST HOSP IP/OBS HIGH 75: CPT | Performed by: INTERNAL MEDICINE

## 2023-11-29 PROCEDURE — 6370000000 HC RX 637 (ALT 250 FOR IP)

## 2023-11-29 PROCEDURE — 78452 HT MUSCLE IMAGE SPECT MULT: CPT

## 2023-11-29 PROCEDURE — 3430000000 HC RX DIAGNOSTIC RADIOPHARMACEUTICAL: Performed by: STUDENT IN AN ORGANIZED HEALTH CARE EDUCATION/TRAINING PROGRAM

## 2023-11-29 PROCEDURE — 6370000000 HC RX 637 (ALT 250 FOR IP): Performed by: INTERNAL MEDICINE

## 2023-11-29 RX ORDER — ENOXAPARIN SODIUM 100 MG/ML
40 INJECTION SUBCUTANEOUS DAILY
Status: DISCONTINUED | OUTPATIENT
Start: 2023-11-29 | End: 2023-11-30 | Stop reason: HOSPADM

## 2023-11-29 RX ORDER — ACETAMINOPHEN 650 MG/1
650 SUPPOSITORY RECTAL EVERY 6 HOURS PRN
Status: DISCONTINUED | OUTPATIENT
Start: 2023-11-29 | End: 2023-11-30 | Stop reason: HOSPADM

## 2023-11-29 RX ORDER — MAGNESIUM SULFATE IN WATER 40 MG/ML
2000 INJECTION, SOLUTION INTRAVENOUS PRN
Status: DISCONTINUED | OUTPATIENT
Start: 2023-11-29 | End: 2023-11-30 | Stop reason: HOSPADM

## 2023-11-29 RX ORDER — ONDANSETRON 2 MG/ML
4 INJECTION INTRAMUSCULAR; INTRAVENOUS EVERY 6 HOURS PRN
Status: DISCONTINUED | OUTPATIENT
Start: 2023-11-29 | End: 2023-11-30 | Stop reason: HOSPADM

## 2023-11-29 RX ORDER — SODIUM CHLORIDE 0.9 % (FLUSH) 0.9 %
5-40 SYRINGE (ML) INJECTION EVERY 12 HOURS SCHEDULED
Status: DISCONTINUED | OUTPATIENT
Start: 2023-11-29 | End: 2023-11-30 | Stop reason: HOSPADM

## 2023-11-29 RX ORDER — ACETAMINOPHEN 325 MG/1
650 TABLET ORAL EVERY 6 HOURS PRN
Status: DISCONTINUED | OUTPATIENT
Start: 2023-11-29 | End: 2023-11-30

## 2023-11-29 RX ORDER — ATORVASTATIN CALCIUM 40 MG/1
40 TABLET, FILM COATED ORAL DAILY
Status: DISCONTINUED | OUTPATIENT
Start: 2023-11-30 | End: 2023-11-30 | Stop reason: HOSPADM

## 2023-11-29 RX ORDER — POLYETHYLENE GLYCOL 3350 17 G/17G
17 POWDER, FOR SOLUTION ORAL DAILY PRN
Status: DISCONTINUED | OUTPATIENT
Start: 2023-11-29 | End: 2023-11-30 | Stop reason: HOSPADM

## 2023-11-29 RX ORDER — SODIUM CHLORIDE 9 MG/ML
INJECTION, SOLUTION INTRAVENOUS PRN
Status: DISCONTINUED | OUTPATIENT
Start: 2023-11-29 | End: 2023-11-30 | Stop reason: HOSPADM

## 2023-11-29 RX ORDER — ASPIRIN 81 MG/1
81 TABLET ORAL DAILY
Status: DISCONTINUED | OUTPATIENT
Start: 2023-11-30 | End: 2023-11-29

## 2023-11-29 RX ORDER — SODIUM CHLORIDE 0.9 % (FLUSH) 0.9 %
5-40 SYRINGE (ML) INJECTION PRN
Status: DISCONTINUED | OUTPATIENT
Start: 2023-11-29 | End: 2023-11-30 | Stop reason: HOSPADM

## 2023-11-29 RX ORDER — POTASSIUM CHLORIDE 20 MEQ/1
40 TABLET, EXTENDED RELEASE ORAL PRN
Status: DISCONTINUED | OUTPATIENT
Start: 2023-11-29 | End: 2023-11-29 | Stop reason: SDUPTHER

## 2023-11-29 RX ORDER — CLOPIDOGREL 300 MG/1
300 TABLET, FILM COATED ORAL ONCE
Status: COMPLETED | OUTPATIENT
Start: 2023-11-29 | End: 2023-11-29

## 2023-11-29 RX ORDER — CLOPIDOGREL BISULFATE 75 MG/1
75 TABLET ORAL DAILY
Status: DISCONTINUED | OUTPATIENT
Start: 2023-11-30 | End: 2023-11-30 | Stop reason: HOSPADM

## 2023-11-29 RX ORDER — POTASSIUM CHLORIDE 7.45 MG/ML
10 INJECTION INTRAVENOUS PRN
Status: DISCONTINUED | OUTPATIENT
Start: 2023-11-29 | End: 2023-11-29 | Stop reason: SDUPTHER

## 2023-11-29 RX ORDER — BUPRENORPHINE HYDROCHLORIDE AND NALOXONE HYDROCHLORIDE DIHYDRATE 2; .5 MG/1; MG/1
4 TABLET SUBLINGUAL DAILY
Status: DISCONTINUED | OUTPATIENT
Start: 2023-11-30 | End: 2023-11-30 | Stop reason: HOSPADM

## 2023-11-29 RX ORDER — ONDANSETRON 4 MG/1
4 TABLET, ORALLY DISINTEGRATING ORAL EVERY 8 HOURS PRN
Status: DISCONTINUED | OUTPATIENT
Start: 2023-11-29 | End: 2023-11-30 | Stop reason: HOSPADM

## 2023-11-29 RX ADMIN — TETROFOSMIN 10 MILLICURIE: 1.38 INJECTION, POWDER, LYOPHILIZED, FOR SOLUTION INTRAVENOUS at 07:35

## 2023-11-29 RX ADMIN — NICOTINE POLACRILEX 4 MG: 4 LOZENGE ORAL at 10:40

## 2023-11-29 RX ADMIN — CLOPIDOGREL BISULFATE 300 MG: 300 TABLET, FILM COATED ORAL at 20:49

## 2023-11-29 RX ADMIN — TETROFOSMIN 30 MILLICURIE: 1.38 INJECTION, POWDER, LYOPHILIZED, FOR SOLUTION INTRAVENOUS at 08:50

## 2023-11-29 RX ADMIN — ASPIRIN 81 MG: 81 TABLET, CHEWABLE ORAL at 10:34

## 2023-11-29 RX ADMIN — ENOXAPARIN SODIUM 40 MG: 100 INJECTION SUBCUTANEOUS at 20:49

## 2023-11-29 RX ADMIN — NICOTINE POLACRILEX 4 MG: 4 LOZENGE ORAL at 20:52

## 2023-11-29 RX ADMIN — SODIUM CHLORIDE, PRESERVATIVE FREE 10 ML: 5 INJECTION INTRAVENOUS at 23:28

## 2023-11-29 NOTE — ED NOTES
.ED TO INPATIENT SBAR HANDOFF    Patient Name: Teddy Grove Case   :  1990  35 y.o. MRN:  [id number]  Preferred Name  Farrah Harris  ED Room #:  D48/X30-26  Family/Caregiver Present no   Restraints no   Sitter no   Sepsis Risk Score Sepsis Risk Score: 0.38    Situation  Code Status: Full Code No additional code details. Allergies: Patient has no known allergies. Weight: Patient Vitals for the past 96 hrs (Last 3 readings):   Weight   23 2153 79.4 kg (175 lb)     Arrived from: home  Chief Complaint:   Chief Complaint   Patient presents with    Shortness of Breath     Patient comes to the ED today for shortness of breathe while at work. Patient felt like he was unable to catch his breathe and became lightheaded and dizzy. Hospital Problem/Diagnosis:  Principal Problem:    Chest pain  Resolved Problems:    * No resolved hospital problems. *    Imaging:   NM Cardiac Stress Test Nuclear Imaging   Final Result      XR CHEST (2 VW)   Final Result      No acute cardiopulmonary findings. Electronically signed by Kimberlyn Delarosa MD        Abnormal labs: Abnormal Labs Reviewed - No abnormal labs to display  Critical values: no     Abnormal Assessment Findings: Stress Test Results    Background  History:   Past Medical History:   Diagnosis Date    CAD S/P percutaneous coronary angioplasty 2021    mild LAD 80-90% stented with 3.5 x 23 mm Xience JONEL.     Hyperlipidemia     Hypertension        Assessment    Vitals/MEWS: MEWS Score: 1  Level of Consciousness: Alert (0)   Vitals:    23 1200 23 1307 23 1426 23 1500   BP: 115/73 135/89 (!) 132/90 133/88   Pulse: 71 71 75 73   Resp: 12 13 13 12   Temp:       TempSrc:       SpO2: 97% 100%  98%   Weight:       Height:         FiO2 (%):   O2 Flow Rate: O2 Device: None (Room air)    Cardiac Rhythm:    Pain Assessment: X  Verbal [] Vianca Prier Scale  Pain Scale: Pain Assessment  Pain Assessment: 0-10  Pain Level: 0  Last documented pain score (0-10

## 2023-11-29 NOTE — ED PROVIDER NOTES
[facility] Emergency Department  ED Observation Progress Note   Emergency Physicians          DiagnosticEvaluation      Stress test       Assessment      Diana OWEN Case continues to be managed in accordance with the observation clinical guidelines for chest pain. An update of his clinical problem list includes:    - chest pain  - hx of CAD       Plan      - Pending stress test and cardiology consultation if needed       Subjective      Rajiv OWEN Case has been admitted to the observation unit for 14 hours.   Serial assessments of his clinical progress include:    Chest pain free  Awaiting stress test results  Received nicotine lozenges       Physical Examination      Physical Exam  On exam:  Eyes: Sclera clear, pupils equally round    Pulm: Symmetric chest wall excursion, no increased WOB  Cor: Regular rhythm, normal rate,    Abd: Soft, nontender, nondistended, no masses  Awake alert, conversant           Ирина Lea MD  11/29/23 1500
[facility] Emergency Department  ED Observation Progress Note   Emergency Physicians          Diagnostic Evaluation      Chest Pain       Assessment      Ede OWEN Case continues to be managed in accordance with the observation clinical guidelines for chest pain. An update of his clinical problem list includes:    - Chest pain, hx of CAD and stenting     Plan      - Cardiac monitoring  - Stress test in AM         Subjective      Giancarlo Novak has been admitted to the observation unit for 10 hours. Serial assessments of his clinical progress include:    No acute events or complaints  Stable telemetry       Physical Examination      Physical Exam  Adult male, resting comfortably on stretcher. Respirations even and unlabored. Heart regular rate and rhythm on monitor.          Chet Wheeler MD  11/29/23 4252
[facility] Emergency Department  ED Observation Disposition Note   Emergency Physicians         Diagnostic Evaluation      Diagnostic studies germane to this period of clinical observation include:    Cardiac enzymes  Stress test     Consultant(s) Final Recommendations      -Cardiology recommends admission based on stress test     Impression and Plan      Kim OWEN Case has been cared for according to the standard [facility] observation protocol for chest. This extended period of observation was specifically required to determine the need for hospitalization. Prior to discharge from observation, the final physical exam is documented below. Significant events during the course of observation based on the goals of the clinical problem list include:    -Positive stress test  -     Based on the patient's condition, clinical response, and diagnostic information obtained during this period of observation, the plan is to Admit to telemetry    The total length of observation was 20 hours.   is the observation disposition attending. Admitted     Robert Jessica Case has undergone comprehensive diagnostic evaluation and therapeutic management in accordance with the CDU guidelines for CP. At the time of disposition, the patient resting comfortably     Physical Examination      Physical Exam  Constitutional:       Appearance: He is not toxic-appearing. Cardiovascular:      Rate and Rhythm: Regular rhythm. Pulmonary:      Effort: Pulmonary effort is normal.   Neurological:      General: No focal deficit present.                 Janki Mohamud MD  11/29/23 4814
Psychiatric:         Mood and Affect: Mood normal.         Behavior: Behavior normal.           Gerardo Wahl MD  11/28/23 3551
takes less than 2 seconds. Coloration: Skin is not pale. Findings: No bruising, erythema or lesion. Neurological:      General: No focal deficit present. Mental Status: He is alert and oriented to person, place, and time. Mental status is at baseline. Cranial Nerves: No cranial nerve deficit. Sensory: No sensory deficit. Motor: No weakness. Coordination: Coordination normal.      Gait: Gait normal.   Psychiatric:         Mood and Affect: Mood normal.         Behavior: Behavior normal.         Thought Content:  Thought content normal.         Judgment: Judgment normal.              ANTHONY Duke - MARIA ISABEL  11/29/23 1065

## 2023-11-30 ENCOUNTER — APPOINTMENT (OUTPATIENT)
Dept: CARDIAC CATH/INVASIVE PROCEDURES | Age: 33
DRG: 287 | End: 2023-11-30
Payer: COMMERCIAL

## 2023-11-30 VITALS
BODY MASS INDEX: 25.33 KG/M2 | TEMPERATURE: 98.2 F | OXYGEN SATURATION: 100 % | RESPIRATION RATE: 16 BRPM | HEIGHT: 68 IN | DIASTOLIC BLOOD PRESSURE: 89 MMHG | WEIGHT: 167.11 LBS | HEART RATE: 66 BPM | SYSTOLIC BLOOD PRESSURE: 131 MMHG

## 2023-11-30 LAB
ANION GAP SERPL CALCULATED.3IONS-SCNC: 8 MMOL/L (ref 3–16)
BUN SERPL-MCNC: 15 MG/DL (ref 7–20)
CALCIUM SERPL-MCNC: 9.4 MG/DL (ref 8.3–10.6)
CHLORIDE SERPL-SCNC: 99 MMOL/L (ref 99–110)
CHOLEST SERPL-MCNC: 170 MG/DL (ref 0–199)
CO2 SERPL-SCNC: 29 MMOL/L (ref 21–32)
CREAT SERPL-MCNC: 1.1 MG/DL (ref 0.9–1.3)
DEPRECATED RDW RBC AUTO: 13.3 % (ref 12.4–15.4)
GFR SERPLBLD CREATININE-BSD FMLA CKD-EPI: >60 ML/MIN/{1.73_M2}
GLUCOSE SERPL-MCNC: 89 MG/DL (ref 70–99)
HCT VFR BLD AUTO: 46.4 % (ref 40.5–52.5)
HDLC SERPL-MCNC: 53 MG/DL (ref 40–60)
HGB BLD-MCNC: 15.7 G/DL (ref 13.5–17.5)
INR PPP: 1.06 (ref 0.84–1.16)
LDLC SERPL CALC-MCNC: 92 MG/DL
MCH RBC QN AUTO: 27.7 PG (ref 26–34)
MCHC RBC AUTO-ENTMCNC: 33.9 G/DL (ref 31–36)
MCV RBC AUTO: 81.8 FL (ref 80–100)
PLATELET # BLD AUTO: 212 K/UL (ref 135–450)
PMV BLD AUTO: 8.4 FL (ref 5–10.5)
POTASSIUM SERPL-SCNC: 4.7 MMOL/L (ref 3.5–5.1)
PROTHROMBIN TIME: 13.8 SEC (ref 11.5–14.8)
RBC # BLD AUTO: 5.67 M/UL (ref 4.2–5.9)
SODIUM SERPL-SCNC: 136 MMOL/L (ref 136–145)
TRIGL SERPL-MCNC: 124 MG/DL (ref 0–150)
VLDLC SERPL CALC-MCNC: 25 MG/DL
WBC # BLD AUTO: 7.1 K/UL (ref 4–11)

## 2023-11-30 PROCEDURE — 99152 MOD SED SAME PHYS/QHP 5/>YRS: CPT

## 2023-11-30 PROCEDURE — 83036 HEMOGLOBIN GLYCOSYLATED A1C: CPT

## 2023-11-30 PROCEDURE — 2500000003 HC RX 250 WO HCPCS

## 2023-11-30 PROCEDURE — 6360000004 HC RX CONTRAST MEDICATION: Performed by: INTERNAL MEDICINE

## 2023-11-30 PROCEDURE — 80061 LIPID PANEL: CPT

## 2023-11-30 PROCEDURE — 36415 COLL VENOUS BLD VENIPUNCTURE: CPT

## 2023-11-30 PROCEDURE — 6360000002 HC RX W HCPCS

## 2023-11-30 PROCEDURE — 93458 L HRT ARTERY/VENTRICLE ANGIO: CPT

## 2023-11-30 PROCEDURE — 2580000003 HC RX 258: Performed by: INTERNAL MEDICINE

## 2023-11-30 PROCEDURE — 85610 PROTHROMBIN TIME: CPT

## 2023-11-30 PROCEDURE — 6370000000 HC RX 637 (ALT 250 FOR IP): Performed by: STUDENT IN AN ORGANIZED HEALTH CARE EDUCATION/TRAINING PROGRAM

## 2023-11-30 PROCEDURE — 99152 MOD SED SAME PHYS/QHP 5/>YRS: CPT | Performed by: INTERNAL MEDICINE

## 2023-11-30 PROCEDURE — 6370000000 HC RX 637 (ALT 250 FOR IP)

## 2023-11-30 PROCEDURE — 85027 COMPLETE CBC AUTOMATED: CPT

## 2023-11-30 PROCEDURE — 93458 L HRT ARTERY/VENTRICLE ANGIO: CPT | Performed by: INTERNAL MEDICINE

## 2023-11-30 PROCEDURE — B2111ZZ FLUOROSCOPY OF MULTIPLE CORONARY ARTERIES USING LOW OSMOLAR CONTRAST: ICD-10-PCS | Performed by: INTERNAL MEDICINE

## 2023-11-30 PROCEDURE — 6370000000 HC RX 637 (ALT 250 FOR IP): Performed by: INTERNAL MEDICINE

## 2023-11-30 PROCEDURE — 2580000003 HC RX 258: Performed by: STUDENT IN AN ORGANIZED HEALTH CARE EDUCATION/TRAINING PROGRAM

## 2023-11-30 PROCEDURE — 4A023N7 MEASUREMENT OF CARDIAC SAMPLING AND PRESSURE, LEFT HEART, PERCUTANEOUS APPROACH: ICD-10-PCS | Performed by: INTERNAL MEDICINE

## 2023-11-30 PROCEDURE — 80048 BASIC METABOLIC PNL TOTAL CA: CPT

## 2023-11-30 PROCEDURE — 6360000002 HC RX W HCPCS: Performed by: STUDENT IN AN ORGANIZED HEALTH CARE EDUCATION/TRAINING PROGRAM

## 2023-11-30 RX ORDER — SODIUM CHLORIDE 0.9 % (FLUSH) 0.9 %
5-40 SYRINGE (ML) INJECTION PRN
Status: DISCONTINUED | OUTPATIENT
Start: 2023-11-30 | End: 2023-11-30 | Stop reason: HOSPADM

## 2023-11-30 RX ORDER — ASPIRIN 81 MG/1
81 TABLET, CHEWABLE ORAL DAILY
Qty: 30 TABLET | Refills: 3 | Status: SHIPPED | OUTPATIENT
Start: 2023-12-01

## 2023-11-30 RX ORDER — PANTOPRAZOLE SODIUM 40 MG/1
40 TABLET, DELAYED RELEASE ORAL
Qty: 90 TABLET | Refills: 1 | Status: SHIPPED | OUTPATIENT
Start: 2023-11-30

## 2023-11-30 RX ORDER — ACETAMINOPHEN 325 MG/1
650 TABLET ORAL EVERY 4 HOURS PRN
Status: DISCONTINUED | OUTPATIENT
Start: 2023-11-30 | End: 2023-11-30 | Stop reason: HOSPADM

## 2023-11-30 RX ORDER — SODIUM CHLORIDE 0.9 % (FLUSH) 0.9 %
5-40 SYRINGE (ML) INJECTION EVERY 12 HOURS SCHEDULED
Status: DISCONTINUED | OUTPATIENT
Start: 2023-11-30 | End: 2023-11-30 | Stop reason: HOSPADM

## 2023-11-30 RX ORDER — LISINOPRIL 5 MG/1
2.5 TABLET ORAL DAILY
Qty: 45 TABLET | Refills: 1 | Status: SHIPPED | OUTPATIENT
Start: 2023-11-30

## 2023-11-30 RX ORDER — SODIUM CHLORIDE 9 MG/ML
INJECTION, SOLUTION INTRAVENOUS CONTINUOUS
Status: DISCONTINUED | OUTPATIENT
Start: 2023-11-30 | End: 2023-11-30

## 2023-11-30 RX ORDER — CLOPIDOGREL BISULFATE 75 MG/1
75 TABLET ORAL DAILY
Qty: 30 TABLET | Refills: 3 | Status: SHIPPED | OUTPATIENT
Start: 2023-11-30

## 2023-11-30 RX ORDER — ATORVASTATIN CALCIUM 40 MG/1
40 TABLET, FILM COATED ORAL DAILY
Qty: 90 TABLET | Refills: 1 | Status: SHIPPED | OUTPATIENT
Start: 2023-11-30

## 2023-11-30 RX ADMIN — IOHEXOL 150 ML: 350 INJECTION, SOLUTION INTRAVENOUS at 08:55

## 2023-11-30 RX ADMIN — SODIUM CHLORIDE, PRESERVATIVE FREE 10 ML: 5 INJECTION INTRAVENOUS at 09:40

## 2023-11-30 RX ADMIN — CLOPIDOGREL BISULFATE 75 MG: 75 TABLET ORAL at 09:39

## 2023-11-30 RX ADMIN — ASPIRIN 81 MG: 81 TABLET, CHEWABLE ORAL at 09:39

## 2023-11-30 RX ADMIN — BUPRENORPHINE HYDROCHLORIDE AND NALOXONE HYDROCHLORIDE DIHYDRATE 4 TABLET: 2; .5 TABLET SUBLINGUAL at 09:39

## 2023-11-30 RX ADMIN — SODIUM CHLORIDE: 9 INJECTION, SOLUTION INTRAVENOUS at 10:12

## 2023-11-30 RX ADMIN — ENOXAPARIN SODIUM 40 MG: 100 INJECTION SUBCUTANEOUS at 09:39

## 2023-11-30 RX ADMIN — ATORVASTATIN CALCIUM 40 MG: 40 TABLET, FILM COATED ORAL at 09:39

## 2023-11-30 NOTE — PLAN OF CARE
Problem: Discharge Planning  Goal: Discharge to home or other facility with appropriate resources  Outcome: Progressing  Flowsheets (Taken 11/30/2023 0457)  Discharge to home or other facility with appropriate resources:   Identify barriers to discharge with patient and caregiver   Arrange for needed discharge resources and transportation as appropriate   Identify discharge learning needs (meds, wound care, etc)  Note: Patient is getting a left heart cath today. Problem: ABCDS Injury Assessment  Goal: Absence of physical injury  Outcome: Progressing  Flowsheets (Taken 11/30/2023 0457)  Absence of Physical Injury: Implement safety measures based on patient assessment  Note: Patient did not have any physical injury during the shift.

## 2023-11-30 NOTE — PROCEDURES
CARDIAC CATHETERIZATION      Monique Novak (35 y.o., male)  1990  [id number]    11/30/2023      INDICATION(s):  SOB, abnormal stress      PROCEDURE:    Left heart cath  Coronary angiography      Risk, benefits alternatives to cardiac catheterization and possible intervention were discussed with the patient. Informed consent was obtained. The patient was brought to the cath lab and was prepped and draped in the normal sterile technique. 1% Xylocaine was used to anesthetize the site. The right radial was cannulated and a 6 Fr sheath was inserted under ultrasonographic guidance. Continuous pulse-oximetry and ECG monitoring was performed, and frequent blood pressure assessment was performed. An independent trained observer pushed medications at my direction. We monitored the patient's level of consciousness and vital signs/physiologic status throughout the procedure (see start and stop times below). All catheter were advanced through the sheath over a guide wire and advanced under fluoroscopic guidance into the ascending aorta. We used 5 Fr catheters for right and left coronary angiography and to cross the aortic valve. Left ventricular pressure and pullback across aortic valve was recorded. The sheath was removed and hemostasis was obtained with a TR band. The patient was moved to the floor in stable condition. There were no complications. Sedation: 1 mg Versed, 25 mcg Fentanyl  Sedation start: 0840  Sedation stop: 9104      Estimated blood loss: <10 mL      HEMODYNAMIC / ANGIOGRAPHIC DATA:      /78, Left ventricular end diastolic pressure was 6 mmHg. There was no gradient across the aortic valve upon pullback. The left main coronary artery arises from the left coronary cusp giving rise to the left anterior descending artery and the left circumflex artery. The left main reveals no angiographically significant stenosis.   The left anterior descending artery arises in normal

## 2023-11-30 NOTE — PROGRESS NOTES
Work Note    To Whom So ever it may concern    Mr Mckay Novak is suffering from a Medical condition and He is in the Hospital on 11/30. He cannot work on 12/1. So please exempt him from work. Thank you.

## 2023-11-30 NOTE — PROGRESS NOTES
4 Eyes Skin Assessment     NAME:  Yasmine Chatterjee Case  YOB: 1990  MEDICAL RECORD NUMBER:  [id number]    The patient is being assessed for  Admission    I agree that at least one RN has performed a thorough Head to Toe Skin Assessment on the patient. ALL assessment sites listed below have been assessed. Areas assessed by both nurses:    Head, Face, Ears, Shoulders, Back, Chest, Arms, Elbows, Hands, Sacrum. Buttock, Coccyx, Ischium, Legs. Feet and Heels, and Under Medical Devices         Does the Patient have a Wound?  No noted wound(s)       Navneet Prevention initiated by RN: No  Wound Care Orders initiated by RN: No    Pressure Injury (Stage 3,4, Unstageable, DTI, NWPT, and Complex wounds) if present, place Wound referral order by RN under : No    New Ostomies, if present place, Ostomy referral order under : No     Nurse 1 eSignature: Electronically signed by Lisa Mahan RN on 11/30/23 at 12:08 AM EST    **SHARE this note so that the co-signing nurse can place an eSignature**    Nurse 2 eSignature: Electronically signed by Chun Lauren RN on 11/30/23 at 12:09 AM EST

## 2023-11-30 NOTE — PLAN OF CARE
Problem: Discharge Planning  Goal: Discharge to home or other facility with appropriate resources  11/30/2023 1529 by Ivania Brady RN  Outcome: Completed  11/30/2023 1210 by Ivania Brady RN  Outcome: Progressing  Flowsheets (Taken 11/30/2023 1210)  Discharge to home or other facility with appropriate resources:   Identify barriers to discharge with patient and caregiver   Identify discharge learning needs (meds, wound care, etc)  11/30/2023 0457 by Zainab Ricketts RN  Outcome: Progressing  Flowsheets (Taken 11/30/2023 3323)  Discharge to home or other facility with appropriate resources:   Identify barriers to discharge with patient and caregiver   Arrange for needed discharge resources and transportation as appropriate   Identify discharge learning needs (meds, wound care, etc)  Note: Patient is getting a left heart cath today. Problem: ABCDS Injury Assessment  Goal: Absence of physical injury  11/30/2023 1529 by Ivania Brady RN  Outcome: Completed  11/30/2023 1210 by Ivania Brady RN  Outcome: Progressing  Flowsheets (Taken 11/30/2023 0457 by Zainab Ricketts RN)  Absence of Physical Injury: Implement safety measures based on patient assessment  11/30/2023 0457 by Zainab Ricketts RN  Outcome: Progressing  Flowsheets (Taken 11/30/2023 0457)  Absence of Physical Injury: Implement safety measures based on patient assessment  Note: Patient did not have any physical injury during the shift.       Problem: Safety - Adult  Goal: Free from fall injury  11/30/2023 1529 by Ivania Brady RN  Outcome: Completed  11/30/2023 1210 by Ivania Brady RN  Outcome: Progressing  Flowsheets (Taken 11/30/2023 1210)  Free From Fall Injury:   Instruct family/caregiver on patient safety   Based on caregiver fall risk screen, instruct family/caregiver to ask for assistance with transferring infant if caregiver noted to have fall risk factors

## 2023-11-30 NOTE — H&P
Myoview/Tetrofosmin  Isotope dose:11.9 mCi         Isotope dose:33.4 mCi  Administration Route:I.V. Administration Route:I.V.  Date:11/29/2023 00:00         Date:11/29/2023 00:00                                 Technique:      Gated  Imaging Results   Applied corrections   - Attenuation correction  applied   - Scatter correction applied     Stress ejection    Ejection fraction:66 %    EDV :100 ml    ESV :34 ml    Stroke volume :66 ml    LV mass :138 gr  Medical History  Signatures   ------------------------------------------------------------------  Electronically signed by Marisol Quiros MD (Interpreting  physician) on 11/29/2023 at 16:12  ------------------------------------------------------------------      XR CHEST (2 VW)    Result Date: 11/28/2023  EXAM: XR CHEST (2 VW) INDICATION: 35 years Male; \"Pain\" COMPARISON: Chest x-ray 3/6/2022 FINDINGS: Normal cardiomediastinal silhouette. No consolidation, pleural effusion, or pneumothorax. No acute osseous abnormality. No acute cardiopulmonary findings.  Electronically signed by Sneha Solano MD        Electronically signed by Obdulia Kelly MD on 11/29/2023 at 8:52 PM

## 2023-11-30 NOTE — PROGRESS NOTES
Patient discharged home. Father transporting patient home. Discharge paperwork reviewed with patient. All questions answered.

## 2023-11-30 NOTE — CARE COORDINATION
Case Management Note        Patient unavailable at this time for assessment         Date/ Time of Note: 11/30/2023 4:29 PM         Note completed by: Landy Bunch RN    Patient Name: Asher Hubbard Case  YOB: 1990    Diagnosis:Chest pain [R07.9]  Chest pain, unspecified type [R07.9]    Patient Admission Status: Inpatient  Date of Admission:11/28/2023  6:04 PM  Length of Stay: 1 GLOS:   Readmission Risk Score: Readmission Risk Score: 5    COVID Result:    Lab Results   Component Value Date/Time    COVID19 Not Detected 08/09/2021 11:24 AM       CM NOTE:    CM attempted to meet with patient for completion of initial face to face assessment at this time. Patient currently off the unit for cath lab procedure. CM has reviewed chart and noted patient from home with family, no current DME or [facility] prior to admission. No needs anticipated at [place]. Will follow up with patient when available. CM will follow up when patient available and will complete initial assessment and assist with needs for discharge.     Landy Bunch RN  [facility] [employer]  Case Management Department  Ph: [phone]  Fax: [phone]

## 2023-11-30 NOTE — DISCHARGE SUMMARY
V2.0  Discharge Summary    Name:  Apple Novak /Age/Sex: 1990 (35 y.o. male)   Admit Date: 2023  Discharge Date: 23    MRN & CSN:  [id number] & [id number] Encounter Date and Time 23 2:51 PM EST    Attending:  Trung Roque MD Discharging Provider: Trung Roque MD       Hospital Course:     Brief HPI: Apple Simpson Case is a 35 y.o. male who presented with past medical history of CAD status post PCI in , tobacco dependence, hypertension who presents to the ED with complaints of chest pressure, shortness of breath       Brief Problem Based Course:   # Shortness of breath and chest pain: Presented with shortness of breath and chest pressure, EKG and troponin within normal limits but work-up with stress test abnormal, cardiology consulted performed LHC with no stenosis after discussing with cardiology discharged patient in a stable condition to continue with aspirin, Plavix, atorvastatin, lisinopril. #Hypertension  #Tobacco active use  #Polysubstance use  Counseling done for cessation    The patient expressed appropriate understanding of, and agreement with the discharge recommendations, medications, and plan. Consults this admission:  IP CONSULT TO CARDIOLOGY    Discharge Diagnosis:   Chest pain  Hypertension  Substance use  Tobacco use    Discharge Instruction:   Follow up appointments: PCP and cardiology  Primary care physician: No primary care provider on file.  within 2 weeks  Diet: regular diet and cardiac diet   Activity: activity as tolerated  Disposition: Discharged to:   [x]Home, [][facility], []SNF, []Acute Rehab, []Hospice   Condition on discharge: Stable  Labs and Tests to be Followed up as an outpatient by PCP or Specialist:     Discharge Medications:        Medication List        START taking these medications      aspirin 81 MG chewable tablet  Take 1 tablet by mouth daily  Start taking on: 2023  Replaces: aspirin EC 81 MG EC tablet     nicotine

## 2023-11-30 NOTE — CONSULTS
Cardiology Consultation/History and Physical                                                                  Pt Name: Evans Carroll Case  Age: 35 y.o. Sex: male  : 1990  Location: 1/A01-01    Referring Physician: Shiloh Gaston MD  Primary cardiologist: Dr Eric Larsen      Reason for Consult:     Reason for Consultation/Chief Complaint: chest pain    HPI:      Evans Carroll Case is a 35 y.o. male with CAD s/p PCI of the LAD in  presents to ED with SOB. He has been having difficulty catching his breath when he takes a deep breath. Previously had exertional chest pain at time of previous PCI. This is different. Histories     Past Medical History:   has a past medical history of CAD S/P percutaneous coronary angioplasty, Hyperlipidemia, and Hypertension. Surgical History:   has no past surgical history on file. Social History:   reports that he has been smoking cigarettes. He has been smoking an average of .25 packs per day. He has never used smokeless tobacco. He reports that he does not currently use alcohol. He reports that he does not currently use drugs after having used the following drugs: Opiates . Family History:  No evidence for sudden cardiac death or premature CAD      Medications:       Home Medications  Were reviewed and are listed in nursing record. and/or listed below  Prior to Admission medications    Medication Sig Start Date End Date Taking?  Authorizing Provider   atorvastatin (LIPITOR) 40 MG tablet TAKE 1 TABLET BY MOUTH EVERY DAY 21   ANTHONY Thompson CNP   lisinopril (PRINIVIL;ZESTRIL) 2.5 MG tablet Take 1 tablet by mouth daily 21   ANTHONY Thompson CNP   pantoprazole (PROTONIX) 40 MG tablet Take 1 tablet by mouth daily 21   ANTHONY Thompson CNP   aspirin EC 81 MG EC tablet Take 1 tablet by mouth daily 21   ANTHONY Thompson CNP   nicotine (NICODERM CQ) 7 MG/24HR Place 1 patch onto the skin daily  Patient not

## 2023-11-30 NOTE — PLAN OF CARE
Problem: Discharge Planning  Goal: Discharge to home or other facility with appropriate resources  11/30/2023 1210 by Vern Núñez RN  Outcome: Progressing  Flowsheets (Taken 11/30/2023 1210)  Discharge to home or other facility with appropriate resources:   Identify barriers to discharge with patient and caregiver   Identify discharge learning needs (meds, wound care, etc)     Problem: ABCDS Injury Assessment  Goal: Absence of physical injury  11/30/2023 1210 by Vern Núñez RN  Outcome: Progressing  Flowsheets (Taken 11/30/2023 0457 by Alisa Lockett RN)  Absence of Physical Injury: Implement safety measures based on patient assessment     Problem: Safety - Adult  Goal: Free from fall injury  Outcome: Progressing  Flowsheets (Taken 11/30/2023 1210)  Free From Fall Injury:   Instruct family/caregiver on patient safety   Based on caregiver fall risk screen, instruct family/caregiver to ask for assistance with transferring infant if caregiver noted to have fall risk factors

## 2023-12-01 LAB
EST. AVERAGE GLUCOSE BLD GHB EST-MCNC: 102.5 MG/DL
HBA1C MFR BLD: 5.2 %